# Patient Record
Sex: FEMALE | Race: WHITE | Employment: FULL TIME | URBAN - METROPOLITAN AREA
[De-identification: names, ages, dates, MRNs, and addresses within clinical notes are randomized per-mention and may not be internally consistent; named-entity substitution may affect disease eponyms.]

---

## 2017-03-10 ENCOUNTER — TELEPHONE (OUTPATIENT)
Dept: FAMILY MEDICINE CLINIC | Age: 32
End: 2017-03-10

## 2017-03-10 NOTE — TELEPHONE ENCOUNTER
Contacted pt to make an appt. Pt said her pap is not due yet,explnd,not a physical appt,pt said she does not wish to pay $15 to just get a prescription change,she will wait and continue as is.

## 2017-03-10 NOTE — TELEPHONE ENCOUNTER
[address] office staff instructed to have patient schedule a f/u for refills per DR. Penn.

## 2017-04-20 ENCOUNTER — TELEPHONE (OUTPATIENT)
Dept: FAMILY MEDICINE CLINIC | Age: 32
End: 2017-04-20

## 2017-04-24 ENCOUNTER — OFFICE VISIT (OUTPATIENT)
Dept: FAMILY MEDICINE CLINIC | Age: 32
End: 2017-04-24

## 2017-04-24 VITALS
TEMPERATURE: 98.5 F | HEART RATE: 67 BPM | HEIGHT: 64 IN | OXYGEN SATURATION: 99 % | WEIGHT: 137 LBS | DIASTOLIC BLOOD PRESSURE: 86 MMHG | BODY MASS INDEX: 23.39 KG/M2 | SYSTOLIC BLOOD PRESSURE: 124 MMHG

## 2017-04-24 DIAGNOSIS — Z30.09 FAMILY PLANNING: Primary | ICD-10-CM

## 2017-04-24 RX ORDER — DROSPIRENONE AND ETHINYL ESTRADIOL 0.02-3(28)
1 KIT ORAL DAILY
Qty: 1 PACKAGE | Refills: 1 | Status: SHIPPED | OUTPATIENT
Start: 2017-04-24 | End: 2017-05-25 | Stop reason: SDUPTHER

## 2017-04-24 NOTE — MR AVS SNAPSHOT
Visit Information Date & Time Provider Department Dept. Phone Encounter #  
 4/24/2017 11:00 AM Jorgito Butts NP [address] [id number] Follow-up Instructions Return if symptoms worsen or fail to improve, for has a scheduled appointment with Dr. Ulises Sorensen for the 17th of May. Your Appointments 5/17/2017 10:00 AM  
PAP/PELVIC with Cecilio Daigle MD  
[address] (--) Appt Note: pap/pelvic  
 [address] [id number] [phone]  
  
   
 [address] [place] [id number] Upcoming Health Maintenance Date Due DTaP/Tdap/Td series (1 - Tdap) 2/17/2008 PAP AKA CERVICAL CYTOLOGY 2/20/2016 INFLUENZA AGE 9 TO ADULT 8/1/2016 Allergies as of 4/24/2017  Review Complete On: 4/24/2017 By: Jorgito Butts NP No Known Allergies Current Immunizations  Never Reviewed No immunizations on file. Not reviewed this visit You Were Diagnosed With   
  
 Codes Comments Family planning    -  Primary ICD-10-CM: Z30.09 
ICD-9-CM: V25.09 Vitals BP Pulse Temp Height(growth percentile) Weight(growth percentile) LMP  
 124/86 (BP 1 Location: Right arm, BP Patient Position: Sitting) 67 98.5 °F (36.9 °C) (Oral) 5' 4\" (1.626 m) 137 lb (62.1 kg) 04/08/2017 SpO2 BMI OB Status Smoking Status 99% 23.52 kg/m2 Having regular periods Never Smoker BMI and BSA Data Body Mass Index Body Surface Area  
 23.52 kg/m 2 1.67 m 2 Preferred Pharmacy Pharmacy [facility] [address] [phone] Your Updated Medication List  
  
   
This list is accurate as of: 4/24/17 11:37 AM.  Always use your most recent med list.  
  
  
  
  
 cyclobenzaprine 10 mg tablet Commonly known as:  FLEXERIL Take 1 Tab by mouth three (3) times daily as needed for Muscle Spasm(s). drospirenone-ethinyl estradiol 3-0.02 mg Tab Commonly known as:  Veronur Sullivan (28) Take 1 Tab by mouth daily. HYDROcodone-acetaminophen 5-325 mg per tablet Commonly known as:  Jarome Smita Take 1 Tab by mouth every four (4) hours as needed. ibuprofen 800 mg tablet Commonly known as:  MOTRIN Take 1 Tab by mouth every eight (8) hours as needed for Pain. Prescriptions Sent to Pharmacy Refills  
 drospirenone-ethinyl estradiol (GIANVI, 28,) 3-0.02 mg tab 1 Sig: Take 1 Tab by mouth daily. Class: Normal  
 Pharmacy: [facility] [address] [facility] #: [phone] Route: Oral  
  
Follow-up Instructions Return if symptoms worsen or fail to improve, for has a scheduled appointment with Dr. Ximena Gray for the 17th of May. Patient Instructions Drospirenone/Ethinyl Estradiol (By mouth) Drospirenone (bnvf-VSGN-dd-none), Ethinyl Estradiol (ETH-i-nil es-tra-DYE-ol) Prevents pregnancy. Also treats premenstrual dysphoric disorder (PMDD) and acne. Brand Name(s):Drospirenone-Ethinyl Estradiol, Aaron Nunez Syeda, [address] [facility], [address] [place] [address], [facility], [address], [facility] There may be other brand names for this medicine. When This Medicine Should Not Be Used: This medicine is not right for everyone. Do not use it if you had an allergic reaction to drospirenone or ethinyl estradiol, or if you are pregnant. Do not use it if you have unusual vaginal bleeding that has not been checked by your doctor, kidney disease, liver disease, adrenal problems, certain heart problems, a blood clotting disorder, or diabetes with kidney, eye, nerve, or blood vessel damage. Do not use it if you have a history of breast cancer. How to Use This Medicine:  
Tablet · Your doctor will tell you how much medicine to use. Do not use more than directed. · Each brand of birth control pills has specific directions.  Follow the patient instructions for your prescribed brand. Talk to your doctor or pharmacist if you have any questions. · Take this medicine at the same time each day, preferably after your evening meal or at bedtime. Birth control pills work best when there is no more than 24 hours between doses. Keep the pills in the original container. Take the pills in the order they appear in the container. · Swallow the tablet whole. Do not crush, break, or chew it. · Follow the instructions in the patient leaflet or call your doctor if you vomit or have diarrhea within 3 to 4 hours of taking this medicine. · Missed dose: Carefully follow the patient instructions if you miss a dose. You may need to use a second form of birth control for several days. Ask your doctor or pharmacist if you have any questions. · Store the medicine in a closed container at room temperature, away from heat, moisture, and direct light. Drugs and Foods to Avoid: Ask your doctor or pharmacist before using any other medicine, including over-the-counter medicines, vitamins, and herbal products. · There are many foods and medicines can affect how drospirenone/ethinyl estradiol works. Tell your doctor about all medicines that you are using. · Do not eat grapefruit or drink grapefruit juice while you are using this medicine. Warnings While Using This Medicine: · Tell your doctor right away if you become pregnant. This medicine could harm your unborn baby if you take it while you are pregnant. · Tell your doctor if you are breastfeeding, or if you had a baby within 4 weeks before you start using this medicine. Tell your doctor if you have inherited angioedema, diabetes, heart or blood vessel disease, high blood pressure, high cholesterol, migraines, or a history of depression or chloasma, especially during pregnancy. Tell your doctor if you have ever had cholestasis (jaundice) caused by pregnancy or birth control pills. Tell your doctor if you smoke. · This medicine may cause the following problems: 
¨ Increased risk of blood clots, which may cause a stroke or heart attack ¨ Possible increased risk of breast or cervical cancer ¨ Liver problems ¨ High blood pressure ¨ Gallbladder disease · You may need to stop using this medicine for a few weeks before and after you have surgery because of the risk of blood clots. · This medicine will not protect you from HIV/AIDS or other sexually transmitted diseases. Talk with your doctor if you have questions. · Tell any doctor or dentist who treats you that you are using this medicine. This medicine may affect certain medical test results. · Your doctor will check the effects of this medicine at regular visits. He may also monitor your blood pressure. Keep all appointments. · Keep all medicine out of the reach of children. Never share your medicine with anyone. Possible Side Effects While Using This Medicine:  
Call your doctor right away if you notice any of these side effects: · Allergic reaction: Itching or hives, swelling in your face or hands, swelling or tingling in your mouth or throat, chest tightness, trouble breathing · Chest pain, trouble breathing, coughing up blood · Confusion, weakness, uneven heartbeat, numbness in your hands, feet, or lips · Dark urine or pale stools, nausea, vomiting, loss of appetite, stomach pain, yellow skin or eyes · Irregular, late, or missed menstrual period · Numbness or weakness on one side of your body, pain in your lower leg, sudden or severe headache, problems with vision, speech, or walking · Severe stomach pain, nausea, vomiting · Unusual or unexpected vaginal bleeding or heavy bleeding If you notice these less serious side effects, talk with your doctor: · Breast pain or tenderness · Headache · Mild nausea, bloating · Vaginal spotting, light bleeding, itching, discharge If you notice other side effects that you think are caused by this medicine, tell your doctor. Call your doctor for medical advice about side effects. You may report side effects to FDA at 9-377-DQL-3704 © 2016 [address] is for End User's use only and may not be sold, redistributed or otherwise used for commercial purposes. The above information is an  only. It is not intended as medical advice for individual conditions or treatments. Talk to your doctor, nurse or pharmacist before following any medical regimen to see if it is safe and effective for you. Drospirenone/Ethinyl Estradiol (By mouth) Drospirenone (dnhm-XEKA-ri-none), Ethinyl Estradiol (ETH-i-nil es-tra-DYE-ol) Prevents pregnancy. Also treats premenstrual dysphoric disorder (PMDD) and acne. Brand Name(s):Drospirenone-Ethinyl Estradiol, Norkatin Paul, Aaron [place], [place], [address] [facility], [address] [place] [address], [facility], [address], Venetta Factor There may be other brand names for this medicine. When This Medicine Should Not Be Used: This medicine is not right for everyone. Do not use it if you had an allergic reaction to drospirenone or ethinyl estradiol, or if you are pregnant. Do not use it if you have unusual vaginal bleeding that has not been checked by your doctor, kidney disease, liver disease, adrenal problems, certain heart problems, a blood clotting disorder, or diabetes with kidney, eye, nerve, or blood vessel damage. Do not use it if you have a history of breast cancer. How to Use This Medicine:  
Tablet · Your doctor will tell you how much medicine to use. Do not use more than directed. · Each brand of birth control pills has specific directions. Follow the patient instructions for your prescribed brand. Talk to your doctor or pharmacist if you have any questions. · Take this medicine at the same time each day, preferably after your evening meal or at bedtime. Birth control pills work best when there is no more than 24 hours between doses.  Keep the pills in the original container. Take the pills in the order they appear in the container. · Swallow the tablet whole. Do not crush, break, or chew it. · Follow the instructions in the patient leaflet or call your doctor if you vomit or have diarrhea within 3 to 4 hours of taking this medicine. · Missed dose: Carefully follow the patient instructions if you miss a dose. You may need to use a second form of birth control for several days. Ask your doctor or pharmacist if you have any questions. · Store the medicine in a closed container at room temperature, away from heat, moisture, and direct light. Drugs and Foods to Avoid: Ask your doctor or pharmacist before using any other medicine, including over-the-counter medicines, vitamins, and herbal products. · There are many foods and medicines can affect how drospirenone/ethinyl estradiol works. Tell your doctor about all medicines that you are using. · Do not eat grapefruit or drink grapefruit juice while you are using this medicine. Warnings While Using This Medicine: · Tell your doctor right away if you become pregnant. This medicine could harm your unborn baby if you take it while you are pregnant. · Tell your doctor if you are breastfeeding, or if you had a baby within 4 weeks before you start using this medicine. Tell your doctor if you have inherited angioedema, diabetes, heart or blood vessel disease, high blood pressure, high cholesterol, migraines, or a history of depression or chloasma, especially during pregnancy. Tell your doctor if you have ever had cholestasis (jaundice) caused by pregnancy or birth control pills. Tell your doctor if you smoke. · This medicine may cause the following problems: 
¨ Increased risk of blood clots, which may cause a stroke or heart attack ¨ Possible increased risk of breast or cervical cancer ¨ Liver problems ¨ High blood pressure ¨ Gallbladder disease · You may need to stop using this medicine for a few weeks before and after you have surgery because of the risk of blood clots. · This medicine will not protect you from HIV/AIDS or other sexually transmitted diseases. Talk with your doctor if you have questions. · Tell any doctor or dentist who treats you that you are using this medicine. This medicine may affect certain medical test results. · Your doctor will check the effects of this medicine at regular visits. He may also monitor your blood pressure. Keep all appointments. · Keep all medicine out of the reach of children. Never share your medicine with anyone. Possible Side Effects While Using This Medicine:  
Call your doctor right away if you notice any of these side effects: · Allergic reaction: Itching or hives, swelling in your face or hands, swelling or tingling in your mouth or throat, chest tightness, trouble breathing · Chest pain, trouble breathing, coughing up blood · Confusion, weakness, uneven heartbeat, numbness in your hands, feet, or lips · Dark urine or pale stools, nausea, vomiting, loss of appetite, stomach pain, yellow skin or eyes · Irregular, late, or missed menstrual period · Numbness or weakness on one side of your body, pain in your lower leg, sudden or severe headache, problems with vision, speech, or walking · Severe stomach pain, nausea, vomiting · Unusual or unexpected vaginal bleeding or heavy bleeding If you notice these less serious side effects, talk with your doctor: · Breast pain or tenderness · Headache · Mild nausea, bloating · Vaginal spotting, light bleeding, itching, discharge If you notice other side effects that you think are caused by this medicine, tell your doctor. Call your doctor for medical advice about side effects. You may report side effects to FDA at 3-436-FDA-5613 © 2016 [address] is for End User's use only and may not be sold, redistributed or otherwise used for commercial purposes. The above information is an  only. It is not intended as medical advice for individual conditions or treatments. Talk to your doctor, nurse or pharmacist before following any medical regimen to see if it is safe and effective for you. Introducing [facility]! Lindaab Cortes introduces [employer] patient portal. Now you can access parts of your medical record, email your doctor's office, and request medication refills online. 1. In your internet browser, go to [email or web address] 2. Click on the First Time User? Click Here link in the Sign In box. You will see the New Member Sign Up page. 3. Enter your [employer] Access Code exactly as it appears below. You will not need to use this code after youve completed the sign-up process. If you do not sign up before the expiration date, you must request a new code. · [employer] Access Code: Isabel Robles Expires: 7/23/2017 11:37 AM 
 
4. Enter the last four digits of your Social Security Number (xxxx) and Date of Birth (mm/dd/yyyy) as indicated and click Submit. You will be taken to the next sign-up page. 5. Create a [employer] ID. This will be your [employer] login ID and cannot be changed, so think of one that is secure and easy to remember. 6. Create a [employer] password. You can change your password at any time. 7. Enter your Password Reset Question and Answer. This can be used at a later time if you forget your password. 8. Enter your e-mail address. You will receive e-mail notification when new information is available in [address] 9. Click Sign Up. You can now view and download portions of your medical record. 10. Click the Download Summary menu link to download a portable copy of your medical information. If you have questions, please visit the Frequently Asked Questions section of the [employer] website. Remember, [employer] is NOT to be used for urgent needs. For medical emergencies, dial 911. Now available from your iPhone and Android! Please provide this summary of care documentation to your next provider. Your primary care clinician is listed as Francisco Javier Middleton. If you have any questions after today's visit, please call [phone].

## 2017-04-24 NOTE — PATIENT INSTRUCTIONS
Drospirenone/Ethinyl Estradiol (By mouth)   Drospirenone (vroc-XHWB-cf-none), Ethinyl Estradiol (ETH-i-nil es-tra-DYE-ol)  Prevents pregnancy. Also treats premenstrual dysphoric disorder (PMDD) and acne. Brand Name(s):Drospirenone-Ethinyl Estradiol, Tod Higgins, Marga, Ocella, Roslyn, Vestura, MARCELINO, Svitlana, Marcelino 28, Mindy   There may be other brand names for this medicine. When This Medicine Should Not Be Used: This medicine is not right for everyone. Do not use it if you had an allergic reaction to drospirenone or ethinyl estradiol, or if you are pregnant. Do not use it if you have unusual vaginal bleeding that has not been checked by your doctor, kidney disease, liver disease, adrenal problems, certain heart problems, a blood clotting disorder, or diabetes with kidney, eye, nerve, or blood vessel damage. Do not use it if you have a history of breast cancer. How to Use This Medicine:   Tablet  · Your doctor will tell you how much medicine to use. Do not use more than directed. · Each brand of birth control pills has specific directions. Follow the patient instructions for your prescribed brand. Talk to your doctor or pharmacist if you have any questions. · Take this medicine at the same time each day, preferably after your evening meal or at bedtime. Birth control pills work best when there is no more than 24 hours between doses. Keep the pills in the original container. Take the pills in the order they appear in the container. · Swallow the tablet whole. Do not crush, break, or chew it. · Follow the instructions in the patient leaflet or call your doctor if you vomit or have diarrhea within 3 to 4 hours of taking this medicine. · Missed dose: Carefully follow the patient instructions if you miss a dose. You may need to use a second form of birth control for several days. Ask your doctor or pharmacist if you have any questions.   · Store the medicine in a closed container at room temperature, away from heat, moisture, and direct light. Drugs and Foods to Avoid:   Ask your doctor or pharmacist before using any other medicine, including over-the-counter medicines, vitamins, and herbal products. · There are many foods and medicines can affect how drospirenone/ethinyl estradiol works. Tell your doctor about all medicines that you are using. · Do not eat grapefruit or drink grapefruit juice while you are using this medicine. Warnings While Using This Medicine:   · Tell your doctor right away if you become pregnant. This medicine could harm your unborn baby if you take it while you are pregnant. · Tell your doctor if you are breastfeeding, or if you had a baby within 4 weeks before you start using this medicine. Tell your doctor if you have inherited angioedema, diabetes, heart or blood vessel disease, high blood pressure, high cholesterol, migraines, or a history of depression or chloasma, especially during pregnancy. Tell your doctor if you have ever had cholestasis (jaundice) caused by pregnancy or birth control pills. Tell your doctor if you smoke. · This medicine may cause the following problems:  ¨ Increased risk of blood clots, which may cause a stroke or heart attack  ¨ Possible increased risk of breast or cervical cancer  ¨ Liver problems  ¨ High blood pressure  ¨ Gallbladder disease  · You may need to stop using this medicine for a few weeks before and after you have surgery because of the risk of blood clots. · This medicine will not protect you from HIV/AIDS or other sexually transmitted diseases. Talk with your doctor if you have questions. · Tell any doctor or dentist who treats you that you are using this medicine. This medicine may affect certain medical test results. · Your doctor will check the effects of this medicine at regular visits. He may also monitor your blood pressure. Keep all appointments. · Keep all medicine out of the reach of children.  Never share your medicine with anyone. Possible Side Effects While Using This Medicine:   Call your doctor right away if you notice any of these side effects:  · Allergic reaction: Itching or hives, swelling in your face or hands, swelling or tingling in your mouth or throat, chest tightness, trouble breathing  · Chest pain, trouble breathing, coughing up blood  · Confusion, weakness, uneven heartbeat, numbness in your hands, feet, or lips  · Dark urine or pale stools, nausea, vomiting, loss of appetite, stomach pain, yellow skin or eyes  · Irregular, late, or missed menstrual period  · Numbness or weakness on one side of your body, pain in your lower leg, sudden or severe headache, problems with vision, speech, or walking  · Severe stomach pain, nausea, vomiting  · Unusual or unexpected vaginal bleeding or heavy bleeding  If you notice these less serious side effects, talk with your doctor:   · Breast pain or tenderness  · Headache  · Mild nausea, bloating  · Vaginal spotting, light bleeding, itching, discharge  If you notice other side effects that you think are caused by this medicine, tell your doctor. Call your doctor for medical advice about side effects. You may report side effects to FDA at 7-680-FDA-7940  © 2016 [address] is for End User's use only and may not be sold, redistributed or otherwise used for commercial purposes. The above information is an  only. It is not intended as medical advice for individual conditions or treatments. Talk to your doctor, nurse or pharmacist before following any medical regimen to see if it is safe and effective for you. Drospirenone/Ethinyl Estradiol (By mouth)   Drospirenone ([id number]-none), Ethinyl Estradiol (ETH-i-nil es-tra-DYE-ol)  Prevents pregnancy. Also treats premenstrual dysphoric disorder (PMDD) and acne.    Brand Name(s):Drospirenone-Ethinyl Estradiol, Darletta Pastures, Marga, Ocella, Roslyn, Vestura, MARCELINO, Svitlana, Marcelino 28, Mindy   There may be other brand names for this medicine. When This Medicine Should Not Be Used: This medicine is not right for everyone. Do not use it if you had an allergic reaction to drospirenone or ethinyl estradiol, or if you are pregnant. Do not use it if you have unusual vaginal bleeding that has not been checked by your doctor, kidney disease, liver disease, adrenal problems, certain heart problems, a blood clotting disorder, or diabetes with kidney, eye, nerve, or blood vessel damage. Do not use it if you have a history of breast cancer. How to Use This Medicine:   Tablet  · Your doctor will tell you how much medicine to use. Do not use more than directed. · Each brand of birth control pills has specific directions. Follow the patient instructions for your prescribed brand. Talk to your doctor or pharmacist if you have any questions. · Take this medicine at the same time each day, preferably after your evening meal or at bedtime. Birth control pills work best when there is no more than 24 hours between doses. Keep the pills in the original container. Take the pills in the order they appear in the container. · Swallow the tablet whole. Do not crush, break, or chew it. · Follow the instructions in the patient leaflet or call your doctor if you vomit or have diarrhea within 3 to 4 hours of taking this medicine. · Missed dose: Carefully follow the patient instructions if you miss a dose. You may need to use a second form of birth control for several days. Ask your doctor or pharmacist if you have any questions. · Store the medicine in a closed container at room temperature, away from heat, moisture, and direct light. Drugs and Foods to Avoid:   Ask your doctor or pharmacist before using any other medicine, including over-the-counter medicines, vitamins, and herbal products. · There are many foods and medicines can affect how drospirenone/ethinyl estradiol works.  Tell your doctor about all medicines that you are using.  · Do not eat grapefruit or drink grapefruit juice while you are using this medicine. Warnings While Using This Medicine:   · Tell your doctor right away if you become pregnant. This medicine could harm your unborn baby if you take it while you are pregnant. · Tell your doctor if you are breastfeeding, or if you had a baby within 4 weeks before you start using this medicine. Tell your doctor if you have inherited angioedema, diabetes, heart or blood vessel disease, high blood pressure, high cholesterol, migraines, or a history of depression or chloasma, especially during pregnancy. Tell your doctor if you have ever had cholestasis (jaundice) caused by pregnancy or birth control pills. Tell your doctor if you smoke. · This medicine may cause the following problems:  ¨ Increased risk of blood clots, which may cause a stroke or heart attack  ¨ Possible increased risk of breast or cervical cancer  ¨ Liver problems  ¨ High blood pressure  ¨ Gallbladder disease  · You may need to stop using this medicine for a few weeks before and after you have surgery because of the risk of blood clots. · This medicine will not protect you from HIV/AIDS or other sexually transmitted diseases. Talk with your doctor if you have questions. · Tell any doctor or dentist who treats you that you are using this medicine. This medicine may affect certain medical test results. · Your doctor will check the effects of this medicine at regular visits. He may also monitor your blood pressure. Keep all appointments. · Keep all medicine out of the reach of children. Never share your medicine with anyone.   Possible Side Effects While Using This Medicine:   Call your doctor right away if you notice any of these side effects:  · Allergic reaction: Itching or hives, swelling in your face or hands, swelling or tingling in your mouth or throat, chest tightness, trouble breathing  · Chest pain, trouble breathing, coughing up blood  · Confusion, weakness, uneven heartbeat, numbness in your hands, feet, or lips  · Dark urine or pale stools, nausea, vomiting, loss of appetite, stomach pain, yellow skin or eyes  · Irregular, late, or missed menstrual period  · Numbness or weakness on one side of your body, pain in your lower leg, sudden or severe headache, problems with vision, speech, or walking  · Severe stomach pain, nausea, vomiting  · Unusual or unexpected vaginal bleeding or heavy bleeding  If you notice these less serious side effects, talk with your doctor:   · Breast pain or tenderness  · Headache  · Mild nausea, bloating  · Vaginal spotting, light bleeding, itching, discharge  If you notice other side effects that you think are caused by this medicine, tell your doctor. Call your doctor for medical advice about side effects. You may report side effects to FDA at 1-862-FDA-2738  © 2016 [address] is for End User's use only and may not be sold, redistributed or otherwise used for commercial purposes. The above information is an  only. It is not intended as medical advice for individual conditions or treatments. Talk to your doctor, nurse or pharmacist before following any medical regimen to see if it is safe and effective for you.

## 2017-04-24 NOTE — PROGRESS NOTES
1. Have you been to the ER, urgent care clinic since your last visit? Hospitalized since your last visit? No    2. Have you seen or consulted any other health care providers outside of the [address] [place] [address] since your last visit? Include any pap smears or colon screening.  No    Is someone accompanying this pt? no    Is the patient using any DME equipment during OV? no      Chief Complaint   Patient presents with    Medication Refill

## 2017-04-24 NOTE — PROGRESS NOTES
HPI  Jojo Knight is a 29 y.o. female  Chief Complaint   Patient presents with    Medication Refill     Here for medication refill of birth control pill. Reports she has another appointment with Dr. Caren Witt for a full wll women on the 17th but needs a refill until then. Reports she switches between the pills and patches during the summer because she swims and sweats more which causes the patches to come off. Requesting a refill on her pill till she is able to get in to see Dr. Caren Witt. Denies chest pain, shortness of breath, leg pains, leg swelling or chest palpitations. Reports last menses on April 8th. Denies any other sexual health issues or side effects from prior pill or patch use. Past Medical History  Past Medical History:   Diagnosis Date    Concussion 10/4/2010    Migraine 10/4/2010       Surgical History  History reviewed. No pertinent surgical history. Medications  Current Outpatient Prescriptions   Medication Sig Dispense Refill    drospirenone-ethinyl estradiol (GIANVI, 28,) 3-0.02 mg tab Take 1 Tab by mouth daily. 1 Package 1    HYDROcodone-acetaminophen (NORCO) 5-325 mg per tablet Take 1 Tab by mouth every four (4) hours as needed. 0    cyclobenzaprine (FLEXERIL) 10 mg tablet Take 1 Tab by mouth three (3) times daily as needed for Muscle Spasm(s). 30 Tab 0    ibuprofen (MOTRIN) 800 mg tablet Take 1 Tab by mouth every eight (8) hours as needed for Pain. 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       Social History Main Topics    Smoking status: Never Smoker    Smokeless tobacco: Never Used    Alcohol use Yes      Comment: due to recent pregnancy patient has stopped all alcohol drinking/ now on occassion    Drug use: Yes     Special: Prescription, OTC    Sexual activity: Not on file     Other Topics Concern    Not on file     Social History Narrative       Problem List  Patient Active Problem List   Diagnosis Code    Concussion S06. 0X9A    Migraine G43.909       Review of Systems  Review of Systems   Respiratory: Negative for shortness of breath. Cardiovascular: Negative for chest pain and palpitations. Gastrointestinal: Negative for abdominal pain, nausea and vomiting. Genitourinary: Negative for dysuria, frequency, hematuria and urgency. Vital Signs  Vitals:    04/24/17 1114   BP: 124/86   Pulse: 67   Temp: 98.5 °F (36.9 °C)   TempSrc: Oral   SpO2: 99%   Weight: 137 lb (62.1 kg)   Height: 5' 4\" (1.626 m)   PainSc:   0 - No pain   LMP: 04/08/2017       Physical Exam  Physical Exam   Constitutional: She is oriented to person, place, and time. HENT:   Mouth/Throat: Oropharynx is clear and moist.   Cardiovascular: Normal rate, regular rhythm, normal heart sounds and intact distal pulses. Exam reveals no gallop and no friction rub. No murmur heard. Pulmonary/Chest: Effort normal and breath sounds normal. No respiratory distress. She has no wheezes. Abdominal: Soft. Bowel sounds are normal. She exhibits no distension. There is no tenderness. Neurological: She is alert and oriented to person, place, and time. Coordination normal.   Skin: Skin is warm and dry. Psychiatric: She has a normal mood and affect. Her behavior is normal. Judgment and thought content normal.   Vitals reviewed. Diagnostics  Orders Placed This Encounter    drospirenone-ethinyl estradiol (GIANVI, 28,) 3-0.02 mg tab     Sig: Take 1 Tab by mouth daily.      Dispense:  1 Package     Refill:  1       Results  Results for orders placed or performed in visit on 09/16/16   [facility] VAGINITIS PLUS   Result Value Ref Range    Atopobium vaginae High - 2 (A) Score    BVAB 2 Low - 0 Score    Megasphaera 1 High - 2 (A) Score    C. albicans, LINDSEY Negative Negative    C. glabrata, LINDSEY Negative Negative    T. vaginalis, LINDSEY Negative Negative    C. trachomatis, LINDSEY Negative Negative    N. gonorrhoeae, LINDSEY Negative Negative         Assessment and Plan  Keon Patton was seen today for medication refill. Diagnoses and all orders for this visit:    Family planning    Other orders  -     drospirenone-ethinyl estradiol (GIANVI, 28,) 3-0.02 mg tab; Take 1 Tab by mouth daily. After care summary printed and reviewed with patient. Plan reviewed with patient. Questions answered. Patient verbalized understanding of plan and is in agreement with plan. Patient to follow up in one Month or earlier if symptoms worsen.  Next appointment already scheduled with her PCP Dr. Oumou Spears for a full well women exam.     Colonel Rizzo, MELISSAP-C

## 2017-05-25 RX ORDER — DROSPIRENONE AND ETHINYL ESTRADIOL 0.02-3(28)
1 KIT ORAL DAILY
Qty: 1 PACKAGE | Refills: 1 | Status: SHIPPED | OUTPATIENT
Start: 2017-05-25 | End: 2017-08-17 | Stop reason: SDUPTHER

## 2017-06-01 ENCOUNTER — OFFICE VISIT (OUTPATIENT)
Dept: FAMILY MEDICINE CLINIC | Age: 32
End: 2017-06-01

## 2017-06-01 VITALS
HEART RATE: 82 BPM | HEIGHT: 64 IN | WEIGHT: 139 LBS | OXYGEN SATURATION: 100 % | DIASTOLIC BLOOD PRESSURE: 77 MMHG | BODY MASS INDEX: 23.73 KG/M2 | RESPIRATION RATE: 20 BRPM | TEMPERATURE: 98.7 F | SYSTOLIC BLOOD PRESSURE: 125 MMHG

## 2017-06-01 DIAGNOSIS — Z01.419 ENCOUNTER FOR WELL WOMAN EXAM WITH ROUTINE GYNECOLOGICAL EXAM: Primary | ICD-10-CM

## 2017-06-01 NOTE — PROGRESS NOTES
Dajuan Lazo [id number] [address]ble female   Chief Complaint   Patient presents with    Well Woman         1. Have you been to the ER, urgent care clinic since your last visit? Hospitalized since your last visit? No    2. Have you seen or consulted any other health care providers outside of the [address] [place] [address] since your last visit? Include any pap smears or colon screening.  No

## 2017-06-01 NOTE — PROGRESS NOTES
Chief Complaint   Patient presents with    Well Woman     SUBJECTIVE:   29 y.o. female for annual routine Pap and checkup. Current Outpatient Prescriptions   Medication Sig Dispense Refill    drospirenone-ethinyl estradiol (GIANVI, 28,) 3-0.02 mg tab Take 1 Tab by mouth daily. 1 Package 1    HYDROcodone-acetaminophen (NORCO) 5-325 mg per tablet Take 1 Tab by mouth every four (4) hours as needed. 0    cyclobenzaprine (FLEXERIL) 10 mg tablet Take 1 Tab by mouth three (3) times daily as needed for Muscle Spasm(s). 30 Tab 0    ibuprofen (MOTRIN) 800 mg tablet Take 1 Tab by mouth every eight (8) hours as needed for Pain. 60 Tab 0     Allergies: Review of patient's allergies indicates no known allergies. Patient's last menstrual period was 05/24/2017. ROS:  Feeling well. No dyspnea or chest pain on exertion. No abdominal pain, change in bowel habits, black or bloody stools. No urinary tract symptoms. GYN ROS: normal menses, no abnormal bleeding, pelvic pain or discharge, no breast pain or new or enlarging lumps on self exam. No neurological complaints. OBJECTIVE:   Visit Vitals    /77    Pulse 82    Temp 98.7 °F (37.1 °C) (Oral)    Resp 20    Ht 5' 4\" (1.626 m)    Wt 139 lb (63 kg)    LMP 05/24/2017    SpO2 100%    BMI 23.86 kg/m2       GEN:The patient appears well, in no distress. EYES: conjunctiva/lids no edema, no erythema, no injection, no icterus  ENT normal ext ears. Normal ext nose. No thyromegaly.    Lungs: nl respiratory effort  Extremities show no edema  Neurological: alert, oriented x 3    BREAST EXAM: breasts appear normal, no suspicious masses, no skin or nipple changes or axillary nodes    PELVIC EXAM: normal external genitalia, vulva, vagina, cervix, uterus and adnexa    ASSESSMENT:   well woman    PLAN:   pap smear  return annually or prn

## 2017-06-06 LAB
CYTOLOGIST CVX/VAG CYTO: NORMAL
CYTOLOGY CVX/VAG DOC THIN PREP: NORMAL
DX ICD CODE: NORMAL
HPV I/H RISK 1 DNA CVX QL PROBE+SIG AMP: NEGATIVE
Lab: NORMAL
Lab: NORMAL
OTHER STN SPEC: NORMAL
PATH REPORT.FINAL DX SPEC: NORMAL
STAT OF ADQ CVX/VAG CYTO-IMP: NORMAL

## 2017-06-08 NOTE — PROGRESS NOTES
Please notify pt: nl cytology as well as negative HPV testing. [id number] Romy Bernal to retest in 5 yrs unless she has concerns sooner.

## 2017-08-14 RX ORDER — DROSPIRENONE AND ETHINYL ESTRADIOL 0.02-3(28)
KIT ORAL
Qty: 28 TAB | Refills: 0 | OUTPATIENT
Start: 2017-08-14

## 2017-08-17 RX ORDER — DROSPIRENONE AND ETHINYL ESTRADIOL 0.02-3(28)
1 KIT ORAL DAILY
Qty: 1 PACKAGE | Refills: 1 | Status: SHIPPED | OUTPATIENT
Start: 2017-08-17 | End: 2017-12-07 | Stop reason: SDUPTHER

## 2017-11-30 ENCOUNTER — OFFICE VISIT (OUTPATIENT)
Dept: FAMILY MEDICINE CLINIC | Age: 32
End: 2017-11-30

## 2017-11-30 VITALS
TEMPERATURE: 98.4 F | BODY MASS INDEX: 24.07 KG/M2 | DIASTOLIC BLOOD PRESSURE: 81 MMHG | SYSTOLIC BLOOD PRESSURE: 134 MMHG | WEIGHT: 141 LBS | HEIGHT: 64 IN | OXYGEN SATURATION: 99 % | HEART RATE: 86 BPM

## 2017-11-30 DIAGNOSIS — R09.81 NASAL CONGESTION: ICD-10-CM

## 2017-11-30 DIAGNOSIS — Z91.09 ENVIRONMENTAL ALLERGIES: Primary | ICD-10-CM

## 2017-11-30 RX ORDER — AMOXICILLIN AND CLAVULANATE POTASSIUM 875; 125 MG/1; MG/1
1 TABLET, FILM COATED ORAL EVERY 12 HOURS
Qty: 14 TAB | Refills: 0 | Status: SHIPPED | OUTPATIENT
Start: 2017-11-30 | End: 2017-11-30 | Stop reason: CLARIF

## 2017-11-30 NOTE — PROGRESS NOTES
SUNSHINE Leos is a 29 y.o. female  Chief Complaint   Patient presents with    Nasal Congestion     started yesterday, was sick 10/23/17, went to [facility], told had a virus    Sneezing     started yesterday, constant sneezing, has not tried any OTC medications,denies fever, sore throat, chills, body aches     Reports congestion and sneezing for the last two days. Denies fevers or chills. Denies sore throat. Denies body aches. Denies having allergies. Denies taking anything for symptoms. Past Medical History  Past Medical History:   Diagnosis Date    Concussion 10/4/2010    Migraine 10/4/2010       Surgical History  No past surgical history on file. Medications  Current Outpatient Prescriptions   Medication Sig Dispense Refill    drospirenone-ethinyl estradiol (GIANVI, 28,) 3-0.02 mg tab Take 1 Tab by mouth daily. 1 Package 1    HYDROcodone-acetaminophen (NORCO) 5-325 mg per tablet Take 1 Tab by mouth every four (4) hours as needed. 0    cyclobenzaprine (FLEXERIL) 10 mg tablet Take 1 Tab by mouth three (3) times daily as needed for Muscle Spasm(s). 30 Tab 0    ibuprofen (MOTRIN) 800 mg tablet Take 1 Tab by mouth every eight (8) hours as needed for Pain. 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       Social History Main Topics    Smoking status: Never Smoker    Smokeless tobacco: Never Used    Alcohol use Yes      Comment: due to recent pregnancy patient has stopped all alcohol drinking/ now on occassion    Drug use: Yes     Special: Prescription, OTC    Sexual activity: Not on file     Other Topics Concern    Not on file     Social History Narrative       Problem List  Patient Active Problem List   Diagnosis Code    Concussion S06. 0X9A    Migraine G43.909       Review of Systems  Review of Systems   Constitutional: Negative for chills and fever. HENT: Positive for congestion. Negative for ear pain and sore throat. Respiratory: Negative for cough, sputum production, shortness of breath and wheezing. Cardiovascular: Negative for chest pain. Endo/Heme/Allergies: Negative for environmental allergies. Vital Signs  Vitals:    11/30/17 1614   BP: 134/81   Pulse: 86   Temp: 98.4 °F (36.9 °C)   TempSrc: Oral   SpO2: 99%   Weight: 141 lb (64 kg)   Height: 5' 4\" (1.626 m)   PainSc:   0 - No pain       Physical Exam  Physical Exam   Constitutional: She is oriented to person, place, and time. HENT:   Right Ear: External ear normal.   Left Ear: External ear normal.   Nose: No mucosal edema or rhinorrhea. Right sinus exhibits maxillary sinus tenderness (mild pressure). Mouth/Throat: Oropharynx is clear and moist.   Cardiovascular: Normal rate, regular rhythm and normal heart sounds. No murmur heard. Pulmonary/Chest: Effort normal and breath sounds normal. No respiratory distress. She has no wheezes. Lymphadenopathy:     She has no cervical adenopathy. Neurological: She is alert and oriented to person, place, and time. Psychiatric: She has a normal mood and affect. Vitals reviewed. Diagnostics  Orders Placed This Encounter    DISCONTD: amoxicillin-clavulanate (AUGMENTIN) 875-125 mg per tablet     Sig: Take 1 Tab by mouth every twelve (12) hours for 7 days.      Dispense:  14 Tab     Refill:  0       Results  Results for orders placed or performed in visit on 06/01/17   PAP (IMAGE GUIDED), LIQUID-BASED   Result Value Ref Range    Diagnosis Comment     Specimen adequacy Comment     Clinician provided ICD10 Comment     Performed by: Comment     QC reviewed by: Comment     . Rachana Blas Note: Comment     Test methodology Comment    HPV HIGH RISK, THIN PREP   Result Value Ref Range    HPV DNA Probe, High Risk Negative Negative       Assessment and Plan  Diagnoses and all orders for this visit:    1. Environmental allergies    2. Nasal congestion      OTC loratadine. OTC nasal spray for nasal congestion. After care summary printed and reviewed with patient. Plan reviewed with patient. Questions answered. Patient verbalized understanding of plan and is in agreement with plan. Patient to follow up as needed or earlier if symptoms worsen.      Ameya Rich, MELISSAP-C

## 2017-11-30 NOTE — MR AVS SNAPSHOT
Visit Information Date & Time Provider Department Dept. Phone Encounter #  
 11/30/2017  4:00 PM [place] MAGDIEL Schwarz [address] [phone] [id number] Follow-up Instructions Return in about 1 month (around 12/30/2017), or if symptoms worsen or fail to improve. Upcoming Health Maintenance Date Due DTaP/Tdap/Td series (1 - Tdap) 2/17/2008 Influenza Age 5 to Adult 8/1/2017 PAP AKA CERVICAL CYTOLOGY 6/1/2022 Allergies as of 11/30/2017  Review Complete On: 11/30/2017 By: Farhat Canales LPN No Known Allergies Current Immunizations  Never Reviewed No immunizations on file. Not reviewed this visit You Were Diagnosed With   
  
 Codes Comments Environmental allergies    -  Primary ICD-10-CM: Z91.09 
ICD-9-CM: V15.09 Nasal congestion     ICD-10-CM: R09.81 ICD-9-CM: 478.19 Vitals BP Pulse Temp Height(growth percentile) Weight(growth percentile) SpO2  
 134/81 86 98.4 °F (36.9 °C) (Oral) 5' 4\" (1.626 m) 141 lb (64 kg) 99% BMI OB Status Smoking Status 24.2 kg/m2 Having regular periods Never Smoker BMI and BSA Data Body Mass Index Body Surface Area  
 24.2 kg/m 2 1.7 m 2 Preferred Pharmacy Pharmacy Name Phone [facility] [address] [address] [phone] Your Updated Medication List  
  
   
This list is accurate as of: 11/30/17  4:39 PM.  Always use your most recent med list.  
  
  
  
  
 cyclobenzaprine 10 mg tablet Commonly known as:  FLEXERIL Take 1 Tab by mouth three (3) times daily as needed for Muscle Spasm(s). drospirenone-ethinyl estradiol 3-0.02 mg Tab Commonly known as:  Lesia Tavarez (28) Take 1 Tab by mouth daily. HYDROcodone-acetaminophen 5-325 mg per tablet Commonly known as:  Rolinda Doles Take 1 Tab by mouth every four (4) hours as needed. ibuprofen 800 mg tablet Commonly known as:  MOTRIN  
 Take 1 Tab by mouth every eight (8) hours as needed for Pain. Follow-up Instructions Return in about 1 month (around 12/30/2017), or if symptoms worsen or fail to improve. Patient Instructions Please contact our office if you have any questions about your visit today. Laryngitis: Care Instructions Your Care Instructions Laryngitis is an inflammation of the voice box (larynx) that causes your voice to become raspy or hoarse. It can be short-lived or long-lasting. Most of the time, laryngitis comes on quickly and lasts as long as 2 weeks. It is caused by overuse, irritation, or infection of the vocal cords inside the larynx. Some of the most common causes are a cold, the flu, or allergies. Loud talking, shouting, cheering, or singing also can cause laryngitis. Stomach acid that backs up into the throat also can make you lose your voice. Resting your voice and taking other steps at home can help you get your voice back. Follow-up care is a key part of your treatment and safety. Be sure to make and go to all appointments, and call your doctor if you are having problems. It's also a good idea to know your test results and keep a list of the medicines you take. How can you care for yourself at home? · Follow your doctor's directions for treating the condition that caused you to lose your voice. If your doctor prescribed antibiotics, take them as directed. Do not stop taking them just because you feel better. You need to take the full course of antibiotics. · Before you use cough and cold medicines, check the label. They may not be safe for young children or for people with certain health problems. · Try to keep stomach acid from backing up into your throat. Do not eat just before bedtime. Reduce the amount of coffee and alcohol you drink, and eat healthy foods.  Taking over-the-counter acid reducers can help when these steps are not enough. In some cases, you may need prescription medicine. · Rest your voice. You do not have to stop speaking, but use your voice as little as possible. Speak softly but do not whisper; whispering can bother your larynx more than speaking softly. Avoid talking on the telephone or trying to speak loudly. · Try not to clear your throat. This can cause more irritation of your larynx. Take an over-the-counter cough suppressant (if your doctor recommends it) if you have a dry cough that does not produce mucus. · Do not smoke or allow others to smoke around you. If you need help quitting, talk to your doctor about stop-smoking programs and medicines. These can increase your chances of quitting for good. · Use a humidifier or vaporizer to add moisture to your bedroom. Humidity helps to thin the mucus in the nasal membranes that causes stuffiness or postnasal drip. Follow the directions for cleaning the machine. · Drink plenty of fluids, enough so that your urine is light yellow or clear like water. If you have kidney, heart, or liver disease and have to limit fluids, talk with your doctor before you increase the amount of fluids you drink. · Use saline (saltwater) nasal washes to help keep your nasal passages open and wash out mucus and bacteria. You can buy saline nose drops at a grocery store or drugstore. Or, you can make your own at home by mixing ½ teaspoon salt, 1 cup water (at room temperature), and ½ teaspoon baking soda. If you make your own, fill a bulb syringe with the solution, insert the tip into your nostril, and squeeze gently. [place] your nose. When should you call for help? Call 911 anytime you think you may need emergency care. For example, call if: 
? · You have trouble breathing. ?Call your doctor now or seek immediate medical care if: 
? · You have new or worse pain. ? · You have trouble swallowing. ? Watch closely for changes in your health, and be sure to contact your doctor if: 
? · You do not get better as expected. Where can you learn more? Go to http://fadia-estefany.info/. Enter B178 in the search box to learn more about \"Laryngitis: Care Instructions. \" Current as of: May 12, 2017 Content Version: 11.4 © 4484-9781 [employer]. Care instructions adapted under license by [employer] (which disclaims liability or warranty for this information). If you have questions about a medical condition or this instruction, always ask your healthcare professional. [address] any warranty or liability for your use of this information. Strep Throat: Care Instructions Your Care Instructions Strep throat is a bacterial infection that causes sudden, severe sore throat and fever. Strep throat, which is caused by bacteria called streptococcus, is treated with antibiotics. Sometimes a strep test is necessary to tell if the sore throat is caused by strep bacteria. Treatment can help ease symptoms and may prevent future problems. Follow-up care is a key part of your treatment and safety. Be sure to make and go to all appointments, and call your doctor if you are having problems. It's also a good idea to know your test results and keep a list of the medicines you take. How can you care for yourself at home? · Take your antibiotics as directed. Do not stop taking them just because you feel better. You need to take the full course of antibiotics. · Strep throat can spread to others until 24 hours after you begin taking antibiotics. During this time, you should avoid contact with other people at work or home, especially infants and children. Do not sneeze or cough on others, and wash your hands often. Keep your drinking glass and eating utensils separate from those of others, and wash these items well in hot, soapy water.  
· Gargle with warm salt water at least once each hour to help reduce swelling and make your throat feel better. Use 1 teaspoon of salt mixed in 8 fluid ounces of warm water. · Take an over-the-counter pain medication, such as acetaminophen (Tylenol), ibuprofen (Advil, Motrin), or naproxen (Aleve). Read and follow all instructions on the label. · Try an over-the-counter anesthetic throat spray or throat lozenges, which may help relieve throat pain. · Drink plenty of fluids. Fluids may help soothe an irritated throat. Hot fluids, such as tea or soup, may help your throat feel better. · Eat soft solids and drink plenty of clear liquids. Flavored ice pops, ice cream, scrambled eggs, sherbet, and gelatin dessert (such as Jell-O) may also soothe the throat. · Get lots of rest. 
· Do not smoke, and avoid secondhand smoke. If you need help quitting, talk to your doctor about stop-smoking programs and medicines. These can increase your chances of quitting for good. · Use a vaporizer or humidifier to add moisture to the air in your bedroom. Follow the directions for cleaning the machine. When should you call for help? Call your doctor now or seek immediate medical care if: 
? · You have a new or higher fever. ? · You have a fever with a stiff neck or severe headache. ? · You have new or worse trouble swallowing. ? · Your sore throat gets much worse on one side. ? · Your pain becomes much worse on one side of your throat. ? Watch closely for changes in your health, and be sure to contact your doctor if: 
? · You are not getting better after 2 days (48 hours). ? · You do not get better as expected. Where can you learn more? Go to http://fadia-estefany.info/. Enter K625 in the search box to learn more about \"Strep Throat: Care Instructions. \" Current as of: May 12, 2017 Content Version: 11.4 © 4219-6194 [employer]Fu.  Care instructions adapted under license by [employer]-pins (which disclaims liability or warranty for this information). If you have questions about a medical condition or this instruction, always ask your healthcare professional. [address] any warranty or liability for your use of this information. Allergies: Care Instructions Your Care Instructions Allergies occur when your body's defense system (immune system) overreacts to certain substances. The immune system treats a harmless substance as if it were a harmful germ or virus. Many things can cause this overreaction, including pollens, medicine, food, dust, animal dander, and mold. Allergies can be mild or severe. Mild allergies can be managed with home treatment. But medicine may be needed to prevent problems. Managing your allergies is an important part of staying healthy. Your doctor may suggest that you have allergy testing to help find out what is causing your allergies. When you know what things trigger your symptoms, you can avoid them. This can prevent allergy symptoms and other health problems. For severe allergies that cause reactions that affect your whole body (anaphylactic reactions), your doctor may prescribe a shot of epinephrine to carry with you in case you have a severe reaction. Learn how to give yourself the shot and keep it with you at all times. Make sure it is not . Follow-up care is a key part of your treatment and safety. Be sure to make and go to all appointments, and call your doctor if you are having problems. It's also a good idea to know your test results and keep a list of the medicines you take. How can you care for yourself at home? · If you have been told by your doctor that dust or dust mites are causing your allergy, decrease the dust around your bed: 
[facility] sheets, pillowcases, and other bedding in hot water every week. ¨ Use dust-proof covers for pillows, duvets, and mattresses. Avoid plastic covers because they tear easily and do not \"breathe. \" Wash as instructed on the label. ¨ Do not use any blankets and pillows that you do not need. ¨ Use blankets that you can wash in your washing machine. ¨ Consider removing drapes and carpets, which attract and hold dust, from your bedroom. · If you are allergic to house dust and mites, do not use home humidifiers. Your doctor can suggest ways you can control dust and mites. · Look for signs of cockroaches. Cockroaches cause allergic reactions. Use cockroach baits to get rid of them. Then, clean your home well. Cockroaches like areas where grocery bags, newspapers, empty bottles, or cardboard boxes are stored. Do not keep these inside your home, and keep trash and food containers sealed. Seal off any spots where cockroaches might enter your home. · If you are allergic to mold, get rid of furniture, rugs, and drapes that smell musty. Check for mold in the bathroom. · If you are allergic to outdoor pollen or mold spores, use air-conditioning. Change or clean all filters every month. Keep windows closed. · If you are allergic to pollen, stay inside when pollen counts are high. Use a vacuum  with a HEPA filter or a double-thickness filter at least two times each week. · Stay inside when air pollution is bad. Avoid paint fumes, perfumes, and other strong odors. · Avoid conditions that make your allergies worse. Stay away from smoke. Do not smoke or let anyone else smoke in your house. Do not use fireplaces or wood-burning stoves. · If you are allergic to your pets, change the air filter in your furnace every month. Use high-efficiency filters. · If you are allergic to pet dander, keep pets outside or out of your bedroom. Old carpet and cloth furniture can hold a lot of animal dander. You may need to replace them. When should you call for help? Give an epinephrine shot if: 
? · You think you are having a severe allergic reaction. ? · You have symptoms in more than one body area, such as mild nausea and an itchy mouth. ?After giving an epinephrine shot call 911, even if you feel better. ?Call 911 if: 
? · You have symptoms of a severe allergic reaction. These may include: 
¨ Sudden raised, red areas (hives) all over your body. ¨ Swelling of the throat, mouth, lips, or tongue. ¨ Trouble breathing. ¨ Passing out (losing consciousness). Or you may feel very lightheaded or suddenly feel weak, confused, or restless. ? · You have been given an epinephrine shot, even if you feel better. ?Call your doctor now or seek immediate medical care if: 
? · You have symptoms of an allergic reaction, such as: ¨ A rash or hives (raised, red areas on the skin). ¨ Itching. ¨ Swelling. ¨ Belly pain, nausea, or vomiting. ? Watch closely for changes in your health, and be sure to contact your doctor if: 
? · You do not get better as expected. Where can you learn more? Go to http://fadia-estefany.info/. Enter T793 in the search box to learn more about \"Allergies: Care Instructions. \" Current as of: September 29, 2016 Content Version: 11.4 © 9330-1057 [employer]. Care instructions adapted under license by readfy (which disclaims liability or warranty for this information). If you have questions about a medical condition or this instruction, always ask your healthcare professional. [address] any warranty or liability for your use of this information. Introducing [facility]! Lauren Adair introduces [employer] patient portal. Now you can access parts of your medical record, email your doctor's office, and request medication refills online. 1. In your internet browser, go to [email or web address] "[email or web address]/[email or web address] 2. Click on the First Time User? Click Here link in the Sign In box. You will see the New Member Sign Up page. 3. Enter your [employer] Access Code exactly as it appears below.  You will not need to use this code after youve completed the sign-up process. If you do not sign up before the expiration date, you must request a new code. · Fivet[employer] Access Code: [email or web address] Expires: 2/28/2018  3:54 PM 
 
4. Enter the last four digits of your Social Security Number (xxxx) and Date of Birth (mm/dd/yyyy) as indicated and click Submit. You will be taken to the next sign-up page. 5. Create a Fivetran ID. This will be your Fivetran login ID and cannot be changed, so think of one that is secure and easy to remember. 6. Create a [employer]et[employer] password. You can change your password at any time. 7. Enter your Password Reset Question and Answer. This can be used at a later time if you forget your password. 8. Enter your e-mail address. You will receive e-mail notification when new information is available in [address]. 9. Click Sign Up. You can now view and download portions of your medical record. 10. Click the Download Summary menu link to download a portable copy of your medical information. If you have questions, please visit the Frequently Asked Questions section of the Fivetran website. Remember, [employer] is NOT to be used for urgent needs. For medical emergencies, dial 911. Now available from your iPhone and Android! Please provide this summary of care documentation to your next provider. Your primary care clinician is listed as David Ramos. If you have any questions after today's visit, please call [phone].

## 2017-11-30 NOTE — PATIENT INSTRUCTIONS
Please contact our office if you have any questions about your visit today. Laryngitis: Care Instructions  Your Care Instructions    Laryngitis is an inflammation of the voice box (larynx) that causes your voice to become raspy or hoarse. It can be short-lived or long-lasting. Most of the time, laryngitis comes on quickly and lasts as long as 2 weeks. It is caused by overuse, irritation, or infection of the vocal cords inside the larynx. Some of the most common causes are a cold, the flu, or allergies. Loud talking, shouting, cheering, or singing also can cause laryngitis. Stomach acid that backs up into the throat also can make you lose your voice. Resting your voice and taking other steps at home can help you get your voice back. Follow-up care is a key part of your treatment and safety. Be sure to make and go to all appointments, and call your doctor if you are having problems. It's also a good idea to know your test results and keep a list of the medicines you take. How can you care for yourself at home? · Follow your doctor's directions for treating the condition that caused you to lose your voice. If your doctor prescribed antibiotics, take them as directed. Do not stop taking them just because you feel better. You need to take the full course of antibiotics. · Before you use cough and cold medicines, check the label. They may not be safe for young children or for people with certain health problems. · Try to keep stomach acid from backing up into your throat. Do not eat just before bedtime. Reduce the amount of coffee and alcohol you drink, and eat healthy foods. Taking over-the-counter acid reducers can help when these steps are not enough. In some cases, you may need prescription medicine. · Rest your voice. You do not have to stop speaking, but use your voice as little as possible. Speak softly but do not whisper; whispering can bother your larynx more than speaking softly.  Avoid talking on the telephone or trying to speak loudly. · Try not to clear your throat. This can cause more irritation of your larynx. Take an over-the-counter cough suppressant (if your doctor recommends it) if you have a dry cough that does not produce mucus. · Do not smoke or allow others to smoke around you. If you need help quitting, talk to your doctor about stop-smoking programs and medicines. These can increase your chances of quitting for good. · Use a humidifier or vaporizer to add moisture to your bedroom. Humidity helps to thin the mucus in the nasal membranes that causes stuffiness or postnasal drip. Follow the directions for cleaning the machine. · Drink plenty of fluids, enough so that your urine is light yellow or clear like water. If you have kidney, heart, or liver disease and have to limit fluids, talk with your doctor before you increase the amount of fluids you drink. · Use saline (saltwater) nasal washes to help keep your nasal passages open and wash out mucus and bacteria. You can buy saline nose drops at a grocery store or drugstore. Or, you can make your own at home by mixing ½ teaspoon salt, 1 cup water (at room temperature), and ½ teaspoon baking soda. If you make your own, fill a bulb syringe with the solution, insert the tip into your nostril, and squeeze gently. Jose Angel Gerardo your nose. When should you call for help? Call 911 anytime you think you may need emergency care. For example, call if:  ? · You have trouble breathing. ?Call your doctor now or seek immediate medical care if:  ? · You have new or worse pain. ? · You have trouble swallowing. ? Watch closely for changes in your health, and be sure to contact your doctor if:  ? · You do not get better as expected. Where can you learn more? Go to http://fadia-estefany.info/. Enter K888 in the search box to learn more about \"Laryngitis: Care Instructions. \"  Current as of:  May 12, 2017  Content Version: 11.4  © 0441-3807 [employer]. Care instructions adapted under license by [employer] (which disclaims liability or warranty for this information). If you have questions about a medical condition or this instruction, always ask your healthcare professional. Sheri Adams any warranty or liability for your use of this information. Strep Throat: Care Instructions  Your Care Instructions    Strep throat is a bacterial infection that causes sudden, severe sore throat and fever. Strep throat, which is caused by bacteria called streptococcus, is treated with antibiotics. Sometimes a strep test is necessary to tell if the sore throat is caused by strep bacteria. Treatment can help ease symptoms and may prevent future problems. Follow-up care is a key part of your treatment and safety. Be sure to make and go to all appointments, and call your doctor if you are having problems. It's also a good idea to know your test results and keep a list of the medicines you take. How can you care for yourself at home? · Take your antibiotics as directed. Do not stop taking them just because you feel better. You need to take the full course of antibiotics. · Strep throat can spread to others until 24 hours after you begin taking antibiotics. During this time, you should avoid contact with other people at work or home, especially infants and children. Do not sneeze or cough on others, and wash your hands often. Keep your drinking glass and eating utensils separate from those of others, and wash these items well in hot, soapy water. · Gargle with warm salt water at least once each hour to help reduce swelling and make your throat feel better. Use 1 teaspoon of salt mixed in 8 fluid ounces of warm water. · Take an over-the-counter pain medication, such as acetaminophen (Tylenol), ibuprofen (Advil, Motrin), or naproxen (Aleve). Read and follow all instructions on the label.   · Try an over-the-counter anesthetic throat spray or throat lozenges, which may help relieve throat pain. · Drink plenty of fluids. Fluids may help soothe an irritated throat. Hot fluids, such as tea or soup, may help your throat feel better. · Eat soft solids and drink plenty of clear liquids. Flavored ice pops, ice cream, scrambled eggs, sherbet, and gelatin dessert (such as Jell-O) may also soothe the throat. · Get lots of rest.  · Do not smoke, and avoid secondhand smoke. If you need help quitting, talk to your doctor about stop-smoking programs and medicines. These can increase your chances of quitting for good. · Use a vaporizer or humidifier to add moisture to the air in your bedroom. Follow the directions for cleaning the machine. When should you call for help? Call your doctor now or seek immediate medical care if:  ? · You have a new or higher fever. ? · You have a fever with a stiff neck or severe headache. ? · You have new or worse trouble swallowing. ? · Your sore throat gets much worse on one side. ? · Your pain becomes much worse on one side of your throat. ? Watch closely for changes in your health, and be sure to contact your doctor if:  ? · You are not getting better after 2 days (48 hours). ? · You do not get better as expected. Where can you learn more? Go to http://fadia-estefany.info/. Enter K625 in the search box to learn more about \"Strep Throat: Care Instructions. \"  Current as of: May 12, 2017  Content Version: 11.4  © 1030-7032 [employer]. Care instructions adapted under license by [employer] (which disclaims liability or warranty for this information). If you have questions about a medical condition or this instruction, always ask your healthcare professional. [address] any warranty or liability for your use of this information.        Allergies: Care Instructions  Your Care Instructions    Allergies occur when your body's defense system (immune system) overreacts to certain substances. The immune system treats a harmless substance as if it were a harmful germ or virus. Many things can cause this overreaction, including pollens, medicine, food, dust, animal dander, and mold. Allergies can be mild or severe. Mild allergies can be managed with home treatment. But medicine may be needed to prevent problems. Managing your allergies is an important part of staying healthy. Your doctor may suggest that you have allergy testing to help find out what is causing your allergies. When you know what things trigger your symptoms, you can avoid them. This can prevent allergy symptoms and other health problems. For severe allergies that cause reactions that affect your whole body (anaphylactic reactions), your doctor may prescribe a shot of epinephrine to carry with you in case you have a severe reaction. Learn how to give yourself the shot and keep it with you at all times. Make sure it is not . Follow-up care is a key part of your treatment and safety. Be sure to make and go to all appointments, and call your doctor if you are having problems. It's also a good idea to know your test results and keep a list of the medicines you take. How can you care for yourself at home? · If you have been told by your doctor that dust or dust mites are causing your allergy, decrease the dust around your bed:  [facility] sheets, pillowcases, and other bedding in hot water every week. ¨ Use dust-proof covers for pillows, duvets, and mattresses. Avoid plastic covers because they tear easily and do not \"breathe. \" Wash as instructed on the label. ¨ Do not use any blankets and pillows that you do not need. ¨ Use blankets that you can wash in your washing machine. ¨ Consider removing drapes and carpets, which attract and hold dust, from your bedroom. · If you are allergic to house dust and mites, do not use home humidifiers.  Your doctor can suggest ways you can control dust and mites. · Look for signs of cockroaches. Cockroaches cause allergic reactions. Use cockroach baits to get rid of them. Then, clean your home well. Cockroaches like areas where grocery bags, newspapers, empty bottles, or cardboard boxes are stored. Do not keep these inside your home, and keep trash and food containers sealed. Seal off any spots where cockroaches might enter your home. · If you are allergic to mold, get rid of furniture, rugs, and drapes that smell musty. Check for mold in the bathroom. · If you are allergic to outdoor pollen or mold spores, use air-conditioning. Change or clean all filters every month. Keep windows closed. · If you are allergic to pollen, stay inside when pollen counts are high. Use a vacuum  with a HEPA filter or a double-thickness filter at least two times each week. · Stay inside when air pollution is bad. Avoid paint fumes, perfumes, and other strong odors. · Avoid conditions that make your allergies worse. Stay away from smoke. Do not smoke or let anyone else smoke in your house. Do not use fireplaces or wood-burning stoves. · If you are allergic to your pets, change the air filter in your furnace every month. Use high-efficiency filters. · If you are allergic to pet dander, keep pets outside or out of your bedroom. Old carpet and cloth furniture can hold a lot of animal dander. You may need to replace them. When should you call for help? Give an epinephrine shot if:  ? · You think you are having a severe allergic reaction. ? · You have symptoms in more than one body area, such as mild nausea and an itchy mouth. ? After giving an epinephrine shot call 911, even if you feel better. ?Call 911 if:  ? · You have symptoms of a severe allergic reaction. These may include:  ¨ Sudden raised, red areas (hives) all over your body. ¨ Swelling of the throat, mouth, lips, or tongue. ¨ Trouble breathing. ¨ Passing out (losing consciousness).  Or you may feel very lightheaded or suddenly feel weak, confused, or restless. ? · You have been given an epinephrine shot, even if you feel better. ?Call your doctor now or seek immediate medical care if:  ? · You have symptoms of an allergic reaction, such as:  ¨ A rash or hives (raised, red areas on the skin). ¨ Itching. ¨ Swelling. ¨ Belly pain, nausea, or vomiting. ? Watch closely for changes in your health, and be sure to contact your doctor if:  ? · You do not get better as expected. Where can you learn more? Go to http://fadia-estefany.info/. Enter M977 in the search box to learn more about \"Allergies: Care Instructions. \"  Current as of: September 29, 2016  Content Version: 11.4  © 2001-3463 [employer]. Care instructions adapted under license by [employer] (which disclaims liability or warranty for this information). If you have questions about a medical condition or this instruction, always ask your healthcare professional. [place] [address] any warranty or liability for your use of this information.

## 2017-12-08 RX ORDER — DROSPIRENONE AND ETHINYL ESTRADIOL 0.02-3(28)
KIT ORAL
Qty: 28 TAB | Refills: 12 | Status: SHIPPED | OUTPATIENT
Start: 2017-12-08 | End: 2019-02-15 | Stop reason: SDUPTHER

## 2018-05-01 ENCOUNTER — OFFICE VISIT (OUTPATIENT)
Dept: FAMILY MEDICINE CLINIC | Age: 33
End: 2018-05-01

## 2018-05-01 VITALS
DIASTOLIC BLOOD PRESSURE: 80 MMHG | HEART RATE: 91 BPM | HEIGHT: 64 IN | BODY MASS INDEX: 24.59 KG/M2 | WEIGHT: 144 LBS | SYSTOLIC BLOOD PRESSURE: 113 MMHG | TEMPERATURE: 98.9 F | RESPIRATION RATE: 16 BRPM

## 2018-05-01 DIAGNOSIS — F32.A DEPRESSION, UNSPECIFIED DEPRESSION TYPE: Primary | ICD-10-CM

## 2018-05-01 RX ORDER — FLUOXETINE 20 MG/1
20 TABLET ORAL DAILY
Qty: 30 TAB | Refills: 5 | Status: SHIPPED | OUTPATIENT
Start: 2018-05-01 | End: 2018-06-01 | Stop reason: SDUPTHER

## 2018-05-01 RX ORDER — NAPROXEN SODIUM 220 MG
220 TABLET ORAL 2 TIMES DAILY WITH MEALS
COMMUNITY
End: 2018-06-22 | Stop reason: ALTCHOICE

## 2018-05-01 NOTE — PROGRESS NOTES
Angel Wolff 34 y.o. female   Chief Complaint   Patient presents with    Behavioral Problem     Patient states she has difficulty remembering things, and her spouse notices that she looses focus easily.  Depression     Patient states symtpoms have been present for a year now. Patient denies any thoughts of harming herself. 1. Have you been to the ER, urgent care clinic since your last visit? Hospitalized since your last visit? No    2. Have you seen or consulted any other health care providers outside of the [address] [place] [address] since your last visit? Include any pap smears or colon screening.  No

## 2018-05-01 NOTE — PROGRESS NOTES
HISTORY OF PRESENT ILLNESS  Angel Wolff is a 34 y.o. female. Chief Complaint   Patient presents with    Behavioral Problem     Patient states she has difficulty remembering things, and her spouse notices that she looses focus easily.  Depression     Patient states symtpoms have been present for a year now. Patient denies any thoughts of harming herself. HPI  Patient is here with a C/O of Depression that has been present for about a year now. Pt reports decreased energy, excessive sleep, anhedonia, wt gain. She feels like she cant get away from stress because of all the issues at work and at home.  (there are stressors at work related to a toxic coworker; there are stressors at home related to a child custody issue.)   Pt also has decreased focus. She has lost 4 credit cards and has had to replace them. She lost car keys recently as well. Pt denies SI/HI. Patient states she has difficulty remembering things, and that her spouse reports she looses focus easily.        PHQ over the last two weeks 5/1/2018   Little interest or pleasure in doing things More than half the days   Feeling down, depressed or hopeless Several days   Total Score PHQ 2 3   Trouble falling or staying asleep, or sleeping too much More than half the days   Feeling tired or having little energy Nearly every day   Poor appetite or overeating More than half the days   Feeling bad about yourself - or that you are a failure or have let yourself or your family down Several days   Trouble concentrating on things such as school, work, reading or watching TV Several days   Moving or speaking so slowly that other people could have noticed; or the opposite being so fidgety that others notice Not at all   Thoughts of being better off dead, or hurting yourself in some way Not at all   PHQ 9 Score 12   How difficult have these problems made it for you to do your work, take care of your home and get along with others Somewhat difficult Patient also completed the Adult ADHD ASRS symptom checklist.    Review of Systems   Constitutional: Negative. HENT: Negative. Respiratory: Negative. Cardiovascular: Negative. Psychiatric/Behavioral: Positive for depression. Negative for suicidal ideas. All other systems reviewed and are negative. Physical Exam  Physical Exam   Nursing note and vitals reviewed. Constitutional: She is oriented to person, place, and time. She appears well-developed and well-nourished. HENT:   Head: Normocephalic and atraumatic. Right Ear: External ear normal.   Left Ear: External ear normal.   Nose: Nose normal.   Eyes: Conjunctivae and EOM are normal.   Neck: Normal range of motion. Neck supple. No JVD present. Carotid bruit is not present. No thyromegaly present. Cardiovascular: Normal rate, regular rhythm, normal heart sounds and intact distal pulses. Exam reveals no gallop and no friction rub. No murmur heard. Pulmonary/Chest: Effort normal and breath sounds normal. She has no wheezes. She has no rhonchi. She has no rales. Abdominal: Soft. Bowel sounds are normal.   Musculoskeletal: Normal range of motion. Neurological: She is alert and oriented to person, place, and time. Coordination normal.   Skin: Skin is warm and dry. Psychiatric: She has a normal mood and affect. Her behavior is normal. Judgment and thought content normal.     ASSESSMENT and PLAN  Diagnoses and all orders for this visit:    1. Depression, unspecified depression type  -     FLUoxetine (PROZAC) 20 mg tablet; Take 1 Tab by mouth daily.  -     REFERRAL TO PSYCHOLOGY    over 25 min spent with pt. Greater than 50% of this time spent in counseling. Current stressors explored in depth. Treatment options discussed in detail. Pt agrees with tx plan.      Follow-up Disposition: 2-4 wks; sooner prn

## 2018-05-31 PROBLEM — F32.A MILD DEPRESSION: Status: ACTIVE | Noted: 2018-05-31

## 2018-06-01 ENCOUNTER — OFFICE VISIT (OUTPATIENT)
Dept: FAMILY MEDICINE CLINIC | Age: 33
End: 2018-06-01

## 2018-06-01 ENCOUNTER — HOSPITAL ENCOUNTER (OUTPATIENT)
Dept: LAB | Age: 33
Discharge: HOME | End: 2018-06-01

## 2018-06-01 VITALS
TEMPERATURE: 97.6 F | RESPIRATION RATE: 20 BRPM | BODY MASS INDEX: 24.24 KG/M2 | SYSTOLIC BLOOD PRESSURE: 133 MMHG | HEART RATE: 121 BPM | OXYGEN SATURATION: 97 % | DIASTOLIC BLOOD PRESSURE: 97 MMHG | WEIGHT: 142 LBS | HEIGHT: 64 IN

## 2018-06-01 DIAGNOSIS — R03.0 ELEVATED BP WITHOUT DIAGNOSIS OF HYPERTENSION: Primary | ICD-10-CM

## 2018-06-01 DIAGNOSIS — R00.0 TACHYCARDIA: ICD-10-CM

## 2018-06-01 DIAGNOSIS — F32.A MILD DEPRESSION: ICD-10-CM

## 2018-06-01 PROCEDURE — 99001 SPECIMEN HANDLING PT-LAB: CPT | Performed by: FAMILY MEDICINE

## 2018-06-01 RX ORDER — METOPROLOL SUCCINATE 25 MG/1
25 TABLET, EXTENDED RELEASE ORAL DAILY
Qty: 30 TAB | Refills: 5 | Status: SHIPPED | OUTPATIENT
Start: 2018-06-01 | End: 2019-09-19

## 2018-06-01 RX ORDER — FLUOXETINE 20 MG/1
40 TABLET ORAL DAILY
Qty: 60 TAB | Refills: 5 | Status: SHIPPED | OUTPATIENT
Start: 2018-06-01 | End: 2018-07-09

## 2018-06-01 NOTE — PROGRESS NOTES
HISTORY OF PRESENT ILLNESS  Toyin Anderson is a 34 y.o. female. Chief Complaint   Patient presents with    Blood Pressure Check     elevated x 1 week.  Irregular Heart Beat     elevated pulse readings    Dizziness     patient reports having more than one episode.  Arm Pain     left arm pain with pain that travels down the arm. Patient states that she is under the care Workers comp for the left arm pain. HPI  Pt had elev bp at her Worker's Comp doctor on 5/20/18. She has been tracking at home and notes that her diastolic bp has been elevated and her pulse has been over 100. She has been drinking lots of water to stay hydrated. Pt reports that her sleep is unchanged because her shoulder pain keeps her awake at night. She feels that the anhedonia has improved. She has started to exercise again. Her focus has not improved. She is tolerating the prozac without any difficulties. Review of Systems   Constitutional: Negative. HENT: Negative. Respiratory: Negative. Cardiovascular: Positive for palpitations. Psychiatric/Behavioral: Positive for depression. Negative for suicidal ideas. All other systems reviewed and are negative. Physical Exam  Physical Exam   Nursing note and vitals reviewed. Constitutional: She is oriented to person, place, and time. She appears well-developed and well-nourished. HENT:   Head: Normocephalic and atraumatic. Right Ear: External ear normal.   Left Ear: External ear normal.   Nose: Nose normal.   Eyes: Conjunctivae and EOM are normal.   Neck: Normal range of motion. Neck supple. No JVD present. Carotid bruit is not present. No thyromegaly present. Cardiovascular: Normal rate, regular rhythm, normal heart sounds and intact distal pulses. Exam reveals no gallop and no friction rub. No murmur heard. Pulmonary/Chest: Effort normal and breath sounds normal. She has no wheezes. She has no rhonchi. She has no rales. Abdominal: Soft.  Bowel sounds are normal.   Musculoskeletal: Normal range of motion. Neurological: She is alert and oriented to person, place, and time. Coordination normal.   Skin: Skin is warm and dry. Psychiatric: She has a normal mood and affect. Her behavior is normal. Judgment and thought content normal.     ASSESSMENT and PLAN  Diagnoses and all orders for this visit:    1. Elevated BP without diagnosis of hypertension  -     METABOLIC PANEL, COMPREHENSIVE; Future  -     CBC WITH AUTOMATED DIFF; Future  -     TSH 3RD GENERATION; Future  -     AMB POC URINALYSIS DIP STICK AUTO W/O MICRO  -     metoprolol succinate (TOPROL-XL) 25 mg XL tablet; Take 1 Tab by mouth daily. 2. Tachycardia  -     METABOLIC PANEL, COMPREHENSIVE; Future  -     CBC WITH AUTOMATED DIFF; Future  -     TSH 3RD GENERATION; Future  -     AMB POC URINALYSIS DIP STICK AUTO W/O MICRO  -     metoprolol succinate (TOPROL-XL) 25 mg XL tablet; Take 1 Tab by mouth daily. 3. Mild depression (HCC)  -     FLUoxetine (PROZAC) 20 mg tablet; Take 2 Tabs by mouth daily.       Follow-up Disposition: keep 6/4/18 appt for f/u

## 2018-06-01 NOTE — PROGRESS NOTES
Ericka Lawler 34 y.o. female   Chief Complaint   Patient presents with    Blood Pressure Check     elevated x 1 week.  Irregular Heart Beat     elevated pulse readings    Dizziness     patient reports having more than one episode.  Arm Pain     left arm pain with pain that travels down the arm. Patient states that she is under the care Workers comp for the left arm pain. 1. Have you been to the ER, urgent care clinic since your last visit? Hospitalized since your last visit? No    2. Have you seen or consulted any other health care providers outside of the [address] [place] [address] since your last visit? Include any pap smears or colon screening.  No

## 2018-06-02 LAB
ALBUMIN SERPL-MCNC: 4.6 G/DL (ref 3.5–5.5)
ALBUMIN/GLOB SERPL: 2.1 {RATIO} (ref 1.2–2.2)
ALP SERPL-CCNC: 60 IU/L (ref 39–117)
ALT SERPL-CCNC: 25 IU/L (ref 0–32)
AST SERPL-CCNC: 26 IU/L (ref 0–40)
BASOPHILS # BLD AUTO: 0 X10E3/UL (ref 0–0.2)
BASOPHILS NFR BLD AUTO: 0 %
BILIRUB SERPL-MCNC: 0.7 MG/DL (ref 0–1.2)
BUN SERPL-MCNC: 9 MG/DL (ref 6–20)
BUN/CREAT SERPL: 11 (ref 9–23)
CALCIUM SERPL-MCNC: 9.7 MG/DL (ref 8.7–10.2)
CHLORIDE SERPL-SCNC: 99 MMOL/L (ref 96–106)
CO2 SERPL-SCNC: 28 MMOL/L (ref 18–29)
CREAT SERPL-MCNC: 0.81 MG/DL (ref 0.57–1)
EOSINOPHIL # BLD AUTO: 0.1 X10E3/UL (ref 0–0.4)
EOSINOPHIL NFR BLD AUTO: 1 %
ERYTHROCYTE [DISTWIDTH] IN BLOOD BY AUTOMATED COUNT: 13.3 % (ref 12.3–15.4)
GFR SERPLBLD CREATININE-BSD FMLA CKD-EPI: 112 ML/MIN/1.73
GFR SERPLBLD CREATININE-BSD FMLA CKD-EPI: 97 ML/MIN/1.73
GLOBULIN SER CALC-MCNC: 2.2 G/DL (ref 1.5–4.5)
GLUCOSE SERPL-MCNC: 87 MG/DL (ref 65–99)
HCT VFR BLD AUTO: 46.6 % (ref 34–46.6)
HGB BLD-MCNC: 15.7 G/DL (ref 11.1–15.9)
IMM GRANULOCYTES # BLD: 0 X10E3/UL (ref 0–0.1)
IMM GRANULOCYTES NFR BLD: 0 %
LYMPHOCYTES # BLD AUTO: 3.3 X10E3/UL (ref 0.7–3.1)
LYMPHOCYTES NFR BLD AUTO: 27 %
MCH RBC QN AUTO: 31 PG (ref 26.6–33)
MCHC RBC AUTO-ENTMCNC: 33.7 G/DL (ref 31.5–35.7)
MCV RBC AUTO: 92 FL (ref 79–97)
MONOCYTES # BLD AUTO: 0.9 X10E3/UL (ref 0.1–0.9)
MONOCYTES NFR BLD AUTO: 7 %
NEUTROPHILS # BLD AUTO: 7.8 X10E3/UL (ref 1.4–7)
NEUTROPHILS NFR BLD AUTO: 65 %
PLATELET # BLD AUTO: 328 X10E3/UL (ref 150–379)
POTASSIUM SERPL-SCNC: 4.2 MMOL/L (ref 3.5–5.2)
PROT SERPL-MCNC: 6.8 G/DL (ref 6–8.5)
RBC # BLD AUTO: 5.07 X10E6/UL (ref 3.77–5.28)
SODIUM SERPL-SCNC: 139 MMOL/L (ref 134–144)
TSH SERPL DL<=0.005 MIU/L-ACNC: 0.94 UIU/ML (ref 0.45–4.5)
WBC # BLD AUTO: 12 X10E3/UL (ref 3.4–10.8)

## 2018-06-04 ENCOUNTER — OFFICE VISIT (OUTPATIENT)
Dept: FAMILY MEDICINE CLINIC | Age: 33
End: 2018-06-04

## 2018-06-04 VITALS
HEIGHT: 64 IN | HEART RATE: 96 BPM | BODY MASS INDEX: 24.24 KG/M2 | TEMPERATURE: 98.4 F | WEIGHT: 142 LBS | SYSTOLIC BLOOD PRESSURE: 131 MMHG | RESPIRATION RATE: 16 BRPM | DIASTOLIC BLOOD PRESSURE: 82 MMHG

## 2018-06-04 DIAGNOSIS — F32.A DEPRESSION, UNSPECIFIED DEPRESSION TYPE: ICD-10-CM

## 2018-06-04 DIAGNOSIS — R03.0 ELEVATED BP WITHOUT DIAGNOSIS OF HYPERTENSION: ICD-10-CM

## 2018-06-04 DIAGNOSIS — R00.0 TACHYCARDIA: Primary | ICD-10-CM

## 2018-06-04 NOTE — PROGRESS NOTES
HISTORY OF PRESENT ILLNESS  Jacki Mckeon is a 34 y.o. female. Chief Complaint   Patient presents with    Follow-up    Depression       HPI  Patient is here for a f/u for depression and elevated bp. Pt notes that her bp has been similar to today's reading over the weekend at home. Her pulse has been under 100. She is not having palpitations as often. Pt was not able to get the increased dose of prozac at her pharmacy. Review of Systems   Constitutional: Negative. HENT: Negative. Respiratory: Negative. Cardiovascular: Negative. Psychiatric/Behavioral: Positive for depression. All other systems reviewed and are negative. Physical Exam  Physical Exam   Nursing note and vitals reviewed. Constitutional: She is oriented to person, place, and time. She appears well-developed and well-nourished. HENT:   Head: Normocephalic and atraumatic. Right Ear: External ear normal.   Left Ear: External ear normal.   Nose: Nose normal.   Eyes: Conjunctivae and EOM are normal.   Neck: Normal range of motion. Neck supple. No JVD present. Carotid bruit is not present. No thyromegaly present. Cardiovascular: Normal rate, regular rhythm, normal heart sounds and intact distal pulses. Exam reveals no gallop and no friction rub. No murmur heard. Pulmonary/Chest: Effort normal and breath sounds normal. She has no wheezes. She has no rhonchi. She has no rales. Abdominal: Soft. Bowel sounds are normal.   Musculoskeletal: Normal range of motion. Neurological: She is alert and oriented to person, place, and time. Coordination normal.   Skin: Skin is warm and dry. Psychiatric: She has a normal mood and affect. Her behavior is normal. Judgment and thought content normal.     ASSESSMENT and PLAN  Diagnoses and all orders for this visit:    1. Tachycardia  Improved. Cont beta blocker.      2. Elevated BP without diagnosis of hypertension  Improved but not at goal.  Will have pt cont toprol xl 25mg daily x one week. Pt to call with readings at that time. Will increase to 50mg every day if not closer to goal.     3. Depression, unspecified depression type  No change in meds after last visit; will contact pharmacy to understand what happened. Intention is for pt to be on prozac 40mg every day.      Follow-up Disposition: 1 month; sooner prn

## 2018-06-04 NOTE — PROGRESS NOTES
Chief Complaint   Patient presents with    Follow-up    Depression     1. Have you been to the ER, urgent care clinic since your last visit? Hospitalized since your last visit? No    2. Have you seen or consulted any other health care providers outside of the [address] [place] [address] since your last visit? Include any pap smears or colon screening.  No

## 2018-06-07 ENCOUNTER — TELEPHONE (OUTPATIENT)
Dept: FAMILY MEDICINE CLINIC | Age: 33
End: 2018-06-07

## 2018-06-07 DIAGNOSIS — R00.0 TACHYCARDIA: Primary | ICD-10-CM

## 2018-06-07 DIAGNOSIS — R03.0 ELEVATED BP WITHOUT DIAGNOSIS OF HYPERTENSION: ICD-10-CM

## 2018-06-07 NOTE — TELEPHONE ENCOUNTER
Pt called and stated that heart rate is still elevated 101 and BP was 111/97. Please advise. Pt stated that she is still taking medication but BP has not decreased.

## 2018-06-12 NOTE — TELEPHONE ENCOUNTER
Spoke with patient and she states that her BP readings have been 120/90 with HR of 115, and 120/91 HR 90.

## 2018-06-20 ENCOUNTER — TELEPHONE (OUTPATIENT)
Dept: FAMILY MEDICINE CLINIC | Age: 33
End: 2018-06-20

## 2018-06-20 NOTE — TELEPHONE ENCOUNTER
Pt went to [facility] yesterday,they put her on light duty for work,pt asking if dr rendon would do something more specific--as dr Jose Enrique Ramirez was aware of her problem, I can see dr rendon referred gher to cardiology

## 2018-06-21 NOTE — TELEPHONE ENCOUNTER
Per DR. Penn the patient was notified that cardiology will need to determine her work status with her appointment that is scheduled for 6-22-18.

## 2018-06-22 ENCOUNTER — OFFICE VISIT (OUTPATIENT)
Dept: CARDIOLOGY CLINIC | Age: 33
End: 2018-06-22

## 2018-06-22 VITALS
BODY MASS INDEX: 22.88 KG/M2 | HEIGHT: 64 IN | HEART RATE: 99 BPM | OXYGEN SATURATION: 98 % | SYSTOLIC BLOOD PRESSURE: 121 MMHG | WEIGHT: 134 LBS | DIASTOLIC BLOOD PRESSURE: 89 MMHG

## 2018-06-22 DIAGNOSIS — R00.2 PALPITATIONS: Primary | ICD-10-CM

## 2018-06-22 NOTE — PROGRESS NOTES
[facility]    Ms. Brayan Mosher is a 34year old female with history of hypertension. Ms. Brayan Mosher is asked to come see me for the initial evaluation. Ms. Brayan Mosher denies any prior history of myocardial infarction, congestive heart failure. She denies any PND or lower extremity swelling. 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 She said that she performed regularly exercise up until a couple of weeks ago. She could go on a treadmill for about 2-3 miles without any symptoms. She said for the last four to six weeks she has been experiencing palpitations, especially in the morning time, where she feels like her heart is pounding and racing. Her heart rate without any activity would go as high as 110 bpm.  Occasionally she feels dizzy along with these symptoms. She denies any prior history of syncopal episode. She denies any resting or exertional chest pain or chest tightness to be concerned of angina. She denies PND or lower extremity swelling. She does complain she has probably lost about 10 pounds unintentionally over the last six to eight months. She denies any diarrhea or excessive sweating. She denies any other specific symptoms at this time. Denies any nausea, vomiting, abdominal pain, fever, chills, sputum production. No hematuria or other bleeding complaints    Past Medical History:   Diagnosis Date    Concussion 10/4/2010    HTN (hypertension)     Migraine          No past surgical history on file. Current Outpatient Prescriptions   Medication Sig    FLUoxetine (PROZAC) 20 mg tablet Take 2 Tabs by mouth daily.  metoprolol succinate (TOPROL-XL) 25 mg XL tablet Take 1 Tab by mouth daily. (Patient taking differently: Take 75 mg by mouth daily.)    GIANVI, 28, 3-0.02 mg tab TAKE ONE TABLET BY MOUTH ONE TIME DAILY      No current facility-administered medications for this visit.         Allergies and Sensitivities:  No Known Allergies    Family History:  Family History   Problem Relation Age of Onset    Other Maternal Grandfather      accidental electrocution    Hypertension Mother     Diabetes Maternal Grandmother        Social History:  Social History   Substance Use Topics    Smoking status: Never Smoker    Smokeless tobacco: Never Used    Alcohol use Yes      Comment: due to recent pregnancy patient has stopped all alcohol drinking/ now on occassion     She  reports that she has never smoked. She has never used smokeless tobacco.  She  reports that she drinks alcohol. Review of Systems:  Cardiac symptoms as noted above in HPI. All others negative. Denies skin rash, joint pain, blurring vision, photophobia, neck pain, hemoptysis, chronic cough, nausea, vomiting, hematuria, burning micturition, BRBPR, chronic headaches. Physical Exam:  BP Readings from Last 3 Encounters:   06/22/18 121/89   06/04/18 131/82   06/01/18 (!) 133/97         Pulse Readings from Last 3 Encounters:   06/22/18 99   06/04/18 96   06/01/18 (!) 121          Wt Readings from Last 3 Encounters:   06/22/18 134 lb (60.8 kg)   06/04/18 142 lb (64.4 kg)   06/01/18 142 lb (64.4 kg)       Constitutional: Oriented to person, place, and time. HENT: Head: Normocephalic and atraumatic. Neck: No JVD present. Carotid bruit is not appreciated. Cardiovascular: Regular rhythm. No murmur, gallop or rubs appreciated  Lung: Breath sounds normal. No respiratory distress. No ronchi or rales appreciated  Abdominal: No tenderness. No rebound and no guarding. Musculoskeletal: There is no lower extremity edema. No cynosis  Lymphadenopathy:  No cervical or supraclavicular adenopathy appriciated. Neurological: No gross motor deficit noted. Skin: No visible skin rash noted. No Ear discharge noted  Psychiatric: Normal mood and affect.    Good distal pulse    Review of Data  LABS:   Lab Results   Component Value Date/Time    Sodium 139 06/01/2018 02:04 PM    Potassium 4.2 06/01/2018 02:04 PM    Chloride 99 06/01/2018 02:04 PM    CO2 28 06/01/2018 02:04 PM    Glucose 87 06/01/2018 02:04 PM    BUN 9 06/01/2018 02:04 PM    Creatinine 0.81 06/01/2018 02:04 PM     No flowsheet data found. Lab Results   Component Value Date/Time    ALT (SGPT) 25 06/01/2018 02:04 PM     No results found for: HBA1C, HGBE8, BQX1EDKI, HMP3LYMP    EKG  (06/18) Sinus rhythm 89 bpm. NO ST changes of ischemia. Normal CA interval    ECHO    IMPRESSION & PLAN:  Ms. Marcio Hull is a 34year old female with a history of hypertension. Hypertension:  Her blood pressure today is 121/89 mmHg. Currently she is on Toprol XL 75 mg daily. I would recommend to continue same. An echocardiogram has been ordered to rule out hypertensive cardiovascular heart disease, especially because of her palpitations and dizziness to rule out hypertensive cardiovascular heart disease. Tachycardia:  Ms. Marcio Hull has been experiencing some palpitations on and off for the last four to six weeks. On EKG, which was performed last week, heart rate was within acceptable range. There were no ST changes of ischemia. CA interval was normal.  She has on and off palpitations lasting 1-2 minutes for the last four to six weeks that happens probably once or twice a week. There is no syncopal episode. I will place Holter monitor for 48 hours to see if she has any arrhythmia. She is already on Toprol XL. I recommend to continue same. Her TSH was checked by PCP two weeks ago and was within normal range. I will make further recommendation based on Holter monitor and echocardiogram findings. This plan was discussed with patient who is in agreement. Thank you for allowing me to participate in patient care. Please feel free to call me if you have any question or concern. Jeffrey Yousif MD  Please note: This document has been produced using voice recognition software.  Unrecognized errors in transcription may be present.

## 2018-06-22 NOTE — PATIENT INSTRUCTIONS
Mackenzie Oliveira will call to schedule your testing within 24 hours. If you do not hear from her, then please call her directly at [phone].

## 2018-06-22 NOTE — PROGRESS NOTES
1. Have you been to the ER, urgent care clinic since your last visit? Hospitalized since your last visit? Np_ [facility]- palpitations and htn   2. Have you seen or consulted any other health care providers outside of the [address] [place] [address] since your last visit? Include any pap smears or colon screening.  NP - No

## 2018-06-22 NOTE — MR AVS SNAPSHOT
[address] [facility] [address] [address] 
[phone] Patient: Alejandra Weeks MRN: [id number] :1985 Visit Information Date & Time Provider Department Dept. Phone Encounter #  
 2018  2:30 PM Jesus Burk MD [address] [place] [address] Specialist at [facility] [phone] [id number] Follow-up Instructions Return in about 4 weeks (around 2018). Your Appointments 2018 11:15 AM  
Follow Up with Steph Peña MD  
[facility] (--) Appt Note: 1 month follow up Sheri [address] [place] [id number] [phone]  
  
   
 [address] [place] [id number]  
  
    
 2018  2:45 PM  
Follow Up with Louise Stein MD  
Cardio Specialist at [facility]/[address] [facility]) Appt Note: after holter echo  
 [facility] [address] [address] [address] [place] [address] Upcoming Health Maintenance Date Due DTaP/Tdap/Td series (1 - Tdap) 2008 Influenza Age 5 to Adult 2018 PAP AKA CERVICAL CYTOLOGY 2022 Allergies as of 2018  Review Complete On: 2018 By: Li Berkowitz LPN No Known Allergies Current Immunizations  Never Reviewed No immunizations on file. Not reviewed this visit You Were Diagnosed With   
  
 Codes Comments Palpitations    -  Primary ICD-10-CM: R00.2 ICD-9-CM: 785.1 Vitals BP Pulse Height(growth percentile) Weight(growth percentile) SpO2 BMI  
 121/89 99 5' 4\" (1.626 m) 134 lb (60.8 kg) 98% 23 kg/m2 OB Status Smoking Status Having regular periods Never Smoker Vitals History BMI and BSA Data Body Mass Index Body Surface Area  
 23 kg/m 2 1.66 m 2 Preferred Pharmacy Pharmacy Name Phone [address] [facility], [address] [address] [phone] Your Updated Medication List  
  
   
This list is accurate as of 6/22/18  3:04 PM.  Always use your most recent med list.  
  
  
  
  
 FLUoxetine 20 mg tablet Commonly known as:  PROzac Take 2 Tabs by mouth daily. GIANVI (28) 3-0.02 mg Tab Generic drug:  drospirenone-ethinyl estradiol TAKE ONE TABLET BY MOUTH ONE TIME DAILY  
  
 metoprolol succinate 25 mg XL tablet Commonly known as:  TOPROL-XL Take 1 Tab by mouth daily. Follow-up Instructions Return in about 4 weeks (around 7/20/2018). Patient Instructions Yajaira Sandoval will call to schedule your testing within 24 hours. If you do not hear from her, then please call her directly at [phone]. Introducing [facility]! [facility] introduces First To File patient portal. Now you can access parts of your medical record, email your doctor's office, and request medication refills online. 1. In your internet browser, go to [email or web address] 2. Click on the First Time User? Click Here link in the Sign In box. You will see the New Member Sign Up page. 3. Enter your First To File Access Code exactly as it appears below. You will not need to use this code after youve completed the sign-up process. If you do not sign up before the expiration date, you must request a new code. · First To File Access Code: [email or web address] Expires: 9/20/2018  3:04 PM 
 
4. Enter the last four digits of your Social Security Number (xxxx) and Date of Birth (mm/dd/yyyy) as indicated and click Submit. You will be taken to the next sign-up page. 5. Create a [employer] ID. This will be your First To File login ID and cannot be changed, so think of one that is secure and easy to remember. 6. Create a First To File password. You can change your password at any time. 7. Enter your Password Reset Question and Answer. This can be used at a later time if you forget your password. 8. Enter your e-mail address. You will receive e-mail notification when new information is available in [address] 9. Click Sign Up. You can now view and download portions of your medical record. 10. Click the Download Summary menu link to download a portable copy of your medical information. If you have questions, please visit the Frequently Asked Questions section of the [employer] website. Remember, [employer] is NOT to be used for urgent needs. For medical emergencies, dial 911. Now available from your iPhone and Android! Please provide this summary of care documentation to your next provider. Your primary care clinician is listed as Joaquin Ramon. If you have any questions after today's visit, please call [phone].

## 2018-06-22 NOTE — LETTER
NOTIFICATION OF RETURN TO WORK / SCHOOL 
 
6/22/2018 3:02 PM 
 
Ms. Abran Chinchilla [address] [address] [address] [facility] [place] To Whom It May Concern: 
 
Abran Chinchilla was under the care of [address]  On June 22, 2018. She will be able to return to work with the following restrictions: Do not lift more than 20lbs. Avoid excessive heat If there are questions or concerns please have the patient contact our office. Sincerely, Margarito Curtis MD

## 2018-07-02 ENCOUNTER — HOSPITAL ENCOUNTER (OUTPATIENT)
Dept: NON INVASIVE DIAGNOSTICS | Age: 33
Discharge: HOME | End: 2018-07-02
Attending: INTERNAL MEDICINE
Payer: COMMERCIAL

## 2018-07-02 VITALS
SYSTOLIC BLOOD PRESSURE: 121 MMHG | BODY MASS INDEX: 22.88 KG/M2 | WEIGHT: 134 LBS | DIASTOLIC BLOOD PRESSURE: 89 MMHG | HEIGHT: 64 IN

## 2018-07-02 DIAGNOSIS — R00.2 PALPITATIONS: ICD-10-CM

## 2018-07-02 LAB
ECHO LA AREA 4C: 12.8 CM2
ECHO LA VOL 2C: 26.86 ML (ref 22–52)
ECHO LA VOL 4C: 27.96 ML (ref 22–52)
ECHO LA VOLUME INDEX A2C: 16.28 ML/M2
ECHO LA VOLUME INDEX A4C: 16.95 ML/M2
ECHO LV E' LATERAL VELOCITY: 10.19 CM/S
ECHO LV INTERNAL DIMENSION DIASTOLIC: 3.94 CM (ref 3.9–5.3)
ECHO LV INTERNAL DIMENSION SYSTOLIC: 2.66 CM
ECHO LV IVSD: 0.91 CM (ref 0.6–0.9)
ECHO LV MASS 2D: 122.7 G (ref 67–162)
ECHO LV MASS INDEX 2D: 74.4 G/M2
ECHO LV POSTERIOR WALL DIASTOLIC: 0.92 CM (ref 0.6–0.9)
ECHO LVOT DIAM: 1.89 CM
ECHO LVOT PEAK GRADIENT: 3.9 MMHG
ECHO LVOT PEAK VELOCITY: 98.23 CM/S
ECHO LVOT VTI: 17.59 CM
ECHO MV A VELOCITY: 60.8 CM/S
ECHO MV E DECELERATION TIME (DT): 241.9 MS
ECHO MV E VELOCITY: 0.66 CM/S
ECHO MV E/A RATIO: 1.1
ECHO MV E/E' LATERAL: 6.4
ECHO RV INTERNAL DIMENSION: 2.31 CM

## 2018-07-02 PROCEDURE — 93225 XTRNL ECG REC<48 HRS REC: CPT

## 2018-07-02 PROCEDURE — 93306 TTE W/DOPPLER COMPLETE: CPT

## 2018-07-09 ENCOUNTER — OFFICE VISIT (OUTPATIENT)
Dept: FAMILY MEDICINE CLINIC | Age: 33
End: 2018-07-09

## 2018-07-09 VITALS
HEIGHT: 64 IN | RESPIRATION RATE: 18 BRPM | HEART RATE: 81 BPM | TEMPERATURE: 98.9 F | BODY MASS INDEX: 23.05 KG/M2 | WEIGHT: 135 LBS | SYSTOLIC BLOOD PRESSURE: 100 MMHG | DIASTOLIC BLOOD PRESSURE: 70 MMHG

## 2018-07-09 DIAGNOSIS — F32.A DEPRESSION, UNSPECIFIED DEPRESSION TYPE: Primary | ICD-10-CM

## 2018-07-09 DIAGNOSIS — R00.0 TACHYCARDIA: ICD-10-CM

## 2018-07-09 DIAGNOSIS — F41.9 ANXIETY: ICD-10-CM

## 2018-07-09 RX ORDER — BUSPIRONE HYDROCHLORIDE 7.5 MG/1
7.5 TABLET ORAL 2 TIMES DAILY
Qty: 60 TAB | Refills: 5 | Status: SHIPPED | OUTPATIENT
Start: 2018-07-09 | End: 2019-09-19

## 2018-07-09 RX ORDER — FLUOXETINE HYDROCHLORIDE 40 MG/1
40 CAPSULE ORAL DAILY
Qty: 30 CAP | Refills: 5 | Status: SHIPPED | OUTPATIENT
Start: 2018-07-09 | End: 2019-09-19

## 2018-07-09 RX ORDER — METOPROLOL SUCCINATE 50 MG/1
50 TABLET, EXTENDED RELEASE ORAL DAILY
Qty: 30 TAB | Refills: 5 | Status: SHIPPED | OUTPATIENT
Start: 2018-07-09 | End: 2019-09-19

## 2018-07-09 NOTE — PROGRESS NOTES
Chief Complaint   Patient presents with    Follow-up     1 month f/u    Irregular Heart Beat     Patient seen by Cardiology DR. Ravin Neil. 1. Have you been to the ER, urgent care clinic since your last visit? Hospitalized since your last visit? No    2. Have you seen or consulted any other health care providers outside of the [address] [place] [address] since your last visit? Include any pap smears or colon screening.  Yes cardiology

## 2018-07-09 NOTE — PATIENT INSTRUCTIONS
Please contact our office if you have any questions about your visit today. Recovering From Depression: Care Instructions Your Care Instructions Taking good care of yourself is important as you recover from depression. In time, your symptoms will fade as your treatment takes hold. Do not give up. Instead, focus your energy on getting better. Your mood will improve. It just takes some time. Focus on things that can help you feel better, such as being with friends and family, eating well, and getting enough rest. But take things slowly. Do not do too much too soon. You will begin to feel better gradually. Follow-up care is a key part of your treatment and safety. Be sure to make and go to all appointments, and call your doctor if you are having problems. It's also a good idea to know your test results and keep a list of the medicines you take. How can you care for yourself at home? Be realistic · If you have a large task to do, break it up into smaller steps you can handle, and just do what you can. · You may want to put off important decisions until your depression has lifted. If you have plans that will have a major impact on your life, such as marriage, divorce, or a job change, try to wait a bit. Talk it over with friends and loved ones who can help you look at the overall picture first. 
· Reaching out to people for help is important. Do not isolate yourself. Let your family and friends help you. Find someone you can trust and confide in, and talk to that person. · Be patient, and be kind to yourself. Remember that depression is not your fault and is not something you can overcome with willpower alone. Treatment is necessary for depression, just like for any other illness. Feeling better takes time, and your mood will improve little by little. Stay active · Stay busy and get outside. Take a walk, or try some other light exercise.  
· Talk with your doctor about an exercise program. Exercise can help with mild depression. · Go to a movie or concert. Take part in a Holiness activity or other social gathering. Go to a ball game. · Ask a friend to have dinner with you. Take care of yourself · Eat a balanced diet with plenty of fresh fruits and vegetables, whole grains, and lean protein. If you have lost your appetite, eat small snacks rather than large meals. · Avoid drinking alcohol or using illegal drugs. Do not take medicines that have not been prescribed for you. They may interfere with medicines you may be taking for depression, or they may make your depression worse. · Take your medicines exactly as they are prescribed. You may start to feel better within 1 to 3 weeks of taking antidepressant medicine. But it can take as many as 6 to 8 weeks to see more improvement. If you have questions or concerns about your medicines, or if you do not notice any improvement by 3 weeks, talk to your doctor. · If you have any side effects from your medicine, tell your doctor. Antidepressants can make you feel tired, dizzy, or nervous. Some people have dry mouth, constipation, headaches, sexual problems, or diarrhea. Many of these side effects are mild and will go away on their own after you have been taking the medicine for a few weeks. Some may last longer. Talk to your doctor if side effects are bothering you too much. You might be able to try a different medicine. · Get enough sleep. If you have problems sleeping: ¨ Go to bed at the same time every night, and get up at the same time every morning. ¨ Keep your bedroom dark and quiet. ¨ Do not exercise after 5:00 p.m. ¨ Avoid drinks with caffeine after 5:00 p.m. · Avoid sleeping pills unless they are prescribed by the doctor treating your depression. Sleeping pills may make you groggy during the day, and they may interact with other medicine you are taking.  
· If you have any other illnesses, such as diabetes, heart disease, or high blood pressure, make sure to continue with your treatment. Tell your doctor about all of the medicines you take, including those with or without a prescription. · Keep the numbers for these national suicide hotlines: 7-757-171-TALK (8-506.597.5007) and 3-649-VRCZEED (2-116.777.5996). If you or someone you know talks about suicide or feeling hopeless, get help right away. When should you call for help? Call 911 anytime you think you may need emergency care. For example, call if: 
? · You feel like hurting yourself or someone else. ? · Someone you know has depression and is about to attempt or is attempting suicide. ?Call your doctor now or seek immediate medical care if: 
? · You hear voices. ? · Someone you know has depression and: 
¨ Starts to give away his or her possessions. ¨ Uses illegal drugs or drinks alcohol heavily. ¨ Talks or writes about death, including writing suicide notes or talking about guns, knives, or pills. ¨ Starts to spend a lot of time alone. ¨ Acts very aggressively or suddenly appears calm. ? Watch closely for changes in your health, and be sure to contact your doctor if: 
? · You do not get better as expected. Where can you learn more? Go to http://fadia-estefany.info/. Enter R128 in the search box to learn more about \"Recovering From Depression: Care Instructions. \" Current as of: May 12, 2017 Content Version: 11.4 © 1810-0075 Healthwise, [employer]. Care instructions adapted under license by [employer] (which disclaims liability or warranty for this information). If you have questions about a medical condition or this instruction, always ask your healthcare professional. [place] [address] any warranty or liability for your use of this information.

## 2018-07-09 NOTE — PROGRESS NOTES
HISTORY OF PRESENT ILLNESS  Wyatt Bradford is a 34 y.o. female. Chief Complaint   Patient presents with    Follow-up     1 month f/u    Irregular Heart Beat     Patient seen by Cardiology DR. Robbin Silver.  Depression     Patient states that the depression has not improved with the medication. HPI  Patient is here for a  follow up of Depression, and tachycardia. Patient was evaluated by Cardiology DR. Robbin Silver and was put on light duty until her tests are all done. Her job did not give her light duty but instead is requiring her to use her leave time. 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 Pt has had her echo and holter. She is waiting for results. Cards did tell her this could all be related to anxiety. Patient states that she feels like her depression is worse due to being home a lot. Pt has not yet seen a therapist.   Patient denies any thoughts of suicide. Patient does not need medication refills today. New concerns today: NONE      Review of Systems   Constitutional: Negative. HENT: Negative. Respiratory: Negative. Cardiovascular: Negative. Psychiatric/Behavioral: Positive for depression. The patient is nervous/anxious. All other systems reviewed and are negative. Physical Exam  Physical Exam   Nursing note and vitals reviewed. Constitutional: She is oriented to person, place, and time. She appears well-developed and well-nourished. HENT:   Head: Normocephalic and atraumatic. Right Ear: External ear normal.   Left Ear: External ear normal.   Nose: Nose normal.   Eyes: Conjunctivae and EOM are normal.   Neck: Normal range of motion. Neck supple. No JVD present. Carotid bruit is not present. No thyromegaly present. Cardiovascular: Normal rate, regular rhythm, normal heart sounds and intact distal pulses. Exam reveals no gallop and no friction rub. No murmur heard. Pulmonary/Chest: Effort normal and breath sounds normal. She has no wheezes. She has no rhonchi. She has no rales. Abdominal: Soft. Bowel sounds are normal.   Musculoskeletal: Normal range of motion. Neurological: She is alert and oriented to person, place, and time. Coordination normal.   Skin: Skin is warm and dry. Psychiatric: She has a normal mood and affect. Her behavior is normal. Judgment and thought content normal.     ASSESSMENT and PLAN  Diagnoses and all orders for this visit:    1. Depression, unspecified depression type  2. Anxiety  -     FLUoxetine (PROZAC) 40 mg capsule; Take 1 Cap by mouth daily.  -   Add:   busPIRone (BUSPAR) 7.5 mg tablet; Take 1 Tab by mouth two (2) times a day. 3. Tachycardia  -     metoprolol succinate (TOPROL-XL) 50 mg XL tablet; Take 1 Tab by mouth daily. Care as per cards. Follow-up Disposition: pt will return for completion of fmla paperwork at the earliest available appt.

## 2018-07-12 ENCOUNTER — OFFICE VISIT (OUTPATIENT)
Dept: FAMILY MEDICINE CLINIC | Age: 33
End: 2018-07-12

## 2018-07-12 VITALS
TEMPERATURE: 98.5 F | BODY MASS INDEX: 23.05 KG/M2 | RESPIRATION RATE: 18 BRPM | WEIGHT: 135 LBS | HEART RATE: 83 BPM | HEIGHT: 64 IN | DIASTOLIC BLOOD PRESSURE: 79 MMHG | SYSTOLIC BLOOD PRESSURE: 112 MMHG

## 2018-07-12 DIAGNOSIS — R00.0 TACHYCARDIA: Primary | ICD-10-CM

## 2018-07-12 DIAGNOSIS — I10 HYPERTENSION, UNSPECIFIED TYPE: ICD-10-CM

## 2018-07-12 DIAGNOSIS — Z02.79 MEDICAL CERTIFICATE ISSUANCE: ICD-10-CM

## 2018-07-12 DIAGNOSIS — R00.2 PALPITATIONS: ICD-10-CM

## 2018-07-12 NOTE — PROGRESS NOTES
Chief Complaint   Patient presents with    Form Completion     FMLA      1. Have you been to the ER, urgent care clinic since your last visit? Hospitalized since your last visit? No    2. Have you seen or consulted any other health care providers outside of the [address] [place] [address] since your last visit? Include any pap smears or colon screening.  No

## 2018-07-12 NOTE — PATIENT INSTRUCTIONS
Please contact our office if you have any questions about your visit today. Recovering From Depression: Care Instructions  Your Care Instructions    Taking good care of yourself is important as you recover from depression. In time, your symptoms will fade as your treatment takes hold. Do not give up. Instead, focus your energy on getting better. Your mood will improve. It just takes some time. Focus on things that can help you feel better, such as being with friends and family, eating well, and getting enough rest. But take things slowly. Do not do too much too soon. You will begin to feel better gradually. Follow-up care is a key part of your treatment and safety. Be sure to make and go to all appointments, and call your doctor if you are having problems. It's also a good idea to know your test results and keep a list of the medicines you take. How can you care for yourself at home? Be realistic  · If you have a large task to do, break it up into smaller steps you can handle, and just do what you can. · You may want to put off important decisions until your depression has lifted. If you have plans that will have a major impact on your life, such as marriage, divorce, or a job change, try to wait a bit. Talk it over with friends and loved ones who can help you look at the overall picture first.  · Reaching out to people for help is important. Do not isolate yourself. Let your family and friends help you. Find someone you can trust and confide in, and talk to that person. · Be patient, and be kind to yourself. Remember that depression is not your fault and is not something you can overcome with willpower alone. Treatment is necessary for depression, just like for any other illness. Feeling better takes time, and your mood will improve little by little. Stay active  · Stay busy and get outside. Take a walk, or try some other light exercise.   · Talk with your doctor about an exercise program. Exercise can help with mild depression. · Go to a movie or concert. Take part in a Orthodoxy activity or other social gathering. Go to a ball game. · Ask a friend to have dinner with you. Take care of yourself  · Eat a balanced diet with plenty of fresh fruits and vegetables, whole grains, and lean protein. If you have lost your appetite, eat small snacks rather than large meals. · Avoid drinking alcohol or using illegal drugs. Do not take medicines that have not been prescribed for you. They may interfere with medicines you may be taking for depression, or they may make your depression worse. · Take your medicines exactly as they are prescribed. You may start to feel better within 1 to 3 weeks of taking antidepressant medicine. But it can take as many as 6 to 8 weeks to see more improvement. If you have questions or concerns about your medicines, or if you do not notice any improvement by 3 weeks, talk to your doctor. · If you have any side effects from your medicine, tell your doctor. Antidepressants can make you feel tired, dizzy, or nervous. Some people have dry mouth, constipation, headaches, sexual problems, or diarrhea. Many of these side effects are mild and will go away on their own after you have been taking the medicine for a few weeks. Some may last longer. Talk to your doctor if side effects are bothering you too much. You might be able to try a different medicine. · Get enough sleep. If you have problems sleeping:  ¨ Go to bed at the same time every night, and get up at the same time every morning. ¨ Keep your bedroom dark and quiet. ¨ Do not exercise after 5:00 p.m. ¨ Avoid drinks with caffeine after 5:00 p.m. · Avoid sleeping pills unless they are prescribed by the doctor treating your depression. Sleeping pills may make you groggy during the day, and they may interact with other medicine you are taking.   · If you have any other illnesses, such as diabetes, heart disease, or high blood pressure, make sure to continue with your treatment. Tell your doctor about all of the medicines you take, including those with or without a prescription. · Keep the numbers for these national suicide hotlines: 7-858-186-TALK (7-600.599.3173) and 2-186-KERISVE (3-605.526.4375). If you or someone you know talks about suicide or feeling hopeless, get help right away. When should you call for help? Call 911 anytime you think you may need emergency care. For example, call if:    · You feel like hurting yourself or someone else.     · Someone you know has depression and is about to attempt or is attempting suicide.   [facility] your doctor now or seek immediate medical care if:    · You hear voices.     · Someone you know has depression and:  ¨ Starts to give away his or her possessions. ¨ Uses illegal drugs or drinks alcohol heavily. ¨ Talks or writes about death, including writing suicide notes or talking about guns, knives, or pills. ¨ Starts to spend a lot of time alone. ¨ Acts very aggressively or suddenly appears calm.    Watch closely for changes in your health, and be sure to contact your doctor if:    · You do not get better as expected. Where can you learn more? Go to http://fadia-estefany.info/. Enter P267 in the search box to learn more about \"Recovering From Depression: Care Instructions. \"  Current as of: December 7, 2017  Content Version: 11.7  © 7880-7554 [employer]. Care instructions adapted under license by [employer] (which disclaims liability or warranty for this information). If you have questions about a medical condition or this instruction, always ask your healthcare professional. [address] any warranty or liability for your use of this information.

## 2018-07-12 NOTE — PROGRESS NOTES
Chief Complaint   Patient presents with    Form Completion     [facility]      HISTORY OF PRESENT ILLNESS  Shruthi High is a 34 y.o. female. HPI  Pt here for completion of la paperwork. She has been out of work since cardiology put her on light duty until her cardiology eval is complete. She was finally able to return to work as a light duty assignment has been approved for her recently. Pt reports that cards was concerned about the strenuous nature of her job as a  and felt that this could exacerbate her tachycardia and palpitations. She had not previously had issues with elevated bp. However, she is now normotensive on her current tx regimen. Review of Systems   Constitutional: Negative. HENT: Negative. Respiratory: Negative. Cardiovascular: Positive for palpitations. Negative for chest pain. All other systems reviewed and are negative. Physical Exam  Physical Exam   Nursing note and vitals reviewed. Constitutional: She is oriented to person, place, and time. She appears well-developed and well-nourished. HENT:   Head: Normocephalic and atraumatic. Right Ear: External ear normal.   Left Ear: External ear normal.   Nose: Nose normal.   Eyes: Conjunctivae and EOM are normal.   Neck: Normal range of motion. Neck supple. No JVD present. Carotid bruit is not present. No thyromegaly present. Cardiovascular: Normal rate, regular rhythm, normal heart sounds and intact distal pulses. Exam reveals no gallop and no friction rub. No murmur heard. Pulmonary/Chest: Effort normal and breath sounds normal. She has no wheezes. She has no rhonchi. She has no rales. Abdominal: Soft. Bowel sounds are normal.   Musculoskeletal: Normal range of motion. Neurological: She is alert and oriented to person, place, and time. Coordination normal.   Skin: Skin is warm and dry. Psychiatric: She has a normal mood and affect.  Her behavior is normal. Judgment and thought content normal.     ASSESSMENT and PLAN  Diagnoses and all orders for this visit:    1. Tachycardia  2. Hypertension, unspecified type  3. Palpitations  Stable, cont pres tx plan. Cards eval in progress. 4. Medical certificate issuance  Forms completed, returned to pt. Follow-up Disposition: as previously planned.

## 2018-07-24 ENCOUNTER — OFFICE VISIT (OUTPATIENT)
Dept: CARDIOLOGY CLINIC | Age: 33
End: 2018-07-24

## 2018-07-24 VITALS
OXYGEN SATURATION: 98 % | SYSTOLIC BLOOD PRESSURE: 103 MMHG | BODY MASS INDEX: 23.05 KG/M2 | DIASTOLIC BLOOD PRESSURE: 74 MMHG | HEART RATE: 82 BPM | WEIGHT: 135 LBS | HEIGHT: 64 IN

## 2018-07-24 DIAGNOSIS — R00.2 PALPITATIONS: Primary | ICD-10-CM

## 2018-07-24 NOTE — MR AVS SNAPSHOT
[facility] [address] [facility] [address] [id number] 
[phone] Patient: Twan Townsend MRN: [id number] :1985 Visit Information Date & Time Provider Department Dept. Phone Encounter #  
 2018  2:45 PM Jesus Oglesby MD [facility] [phone] [id number] Follow-up Instructions Return in about 1 year (around 2019). Your Appointments 2018 12:45 PM  
Follow Up with Kendra Chaidez MD  
[facility] (--) Appt Note: kati  
 [address] [place] [id number] [phone]  
  
   
 [address] [facility] [address] Upcoming Health Maintenance Date Due DTaP/Tdap/Td series (1 - Tdap) 2008 Influenza Age 5 to Adult 2018 PAP AKA CERVICAL CYTOLOGY 2022 Allergies as of 2018  Review Complete On: 2018 By: Herbie Spears RN No Known Allergies Current Immunizations  Never Reviewed No immunizations on file. Not reviewed this visit Vitals BP Pulse Height(growth percentile) Weight(growth percentile) LMP SpO2  
 103/74 82 5' 4\" (1.626 m) 135 lb (61.2 kg) 2018 98% BMI OB Status Smoking Status 23.17 kg/m2 Having regular periods Never Smoker BMI and BSA Data Body Mass Index Body Surface Area  
 23.17 kg/m 2 1.66 m 2 Preferred Pharmacy Pharmacy Name Phone [address] [place] [address] [phone] Your Updated Medication List  
  
   
This list is accurate as of 18  3:36 PM.  Always use your most recent med list.  
  
  
  
  
 busPIRone 7.5 mg tablet Commonly known as:  BUSPAR Take 1 Tab by mouth two (2) times a day. FLUoxetine 40 mg capsule Commonly known as:  PROzac Take 1 Cap by mouth daily. GIANVI (28) 3-0.02 mg Tab Generic drug:  drospirenone-ethinyl estradiol TAKE ONE TABLET BY MOUTH ONE TIME DAILY * metoprolol succinate 25 mg XL tablet Commonly known as:  TOPROL-XL Take 1 Tab by mouth daily. * metoprolol succinate 50 mg XL tablet Commonly known as:  TOPROL-XL Take 1 Tab by mouth daily. * Notice: This list has 2 medication(s) that are the same as other medications prescribed for you. Read the directions carefully, and ask your doctor or other care provider to review them with you. Follow-up Instructions Return in about 1 year (around 7/24/2019). Patient Instructions Start taking Toprol 50 mg daily F/U in 1 year Introducing [facility] SERVICES! Poonam Chun introduces [employer] patient portal. Now you can access parts of your medical record, email your doctor's office, and request medication refills online. 1. In your internet browser, go to [email or web address] 2. Click on the First Time User? Click Here link in the Sign In box. You will see the New Member Sign Up page. 3. Enter your [employer] Access Code exactly as it appears below. You will not need to use this code after youve completed the sign-up process. If you do not sign up before the expiration date, you must request a new code. · Y[employer]un Access Code: [email or web address] Expires: 9/20/2018  3:04 PM 
 
4. Enter the last four digits of your Social Security Number (xxxx) and Date of Birth (mm/dd/yyyy) as indicated and click Submit. You will be taken to the next sign-up page. 5. Create a Yun Yun ID. This will be your Y[employer] Yun login ID and cannot be changed, so think of one that is secure and easy to remember. 6. Create a Yun Yun password. You can change your password at any time. 7. Enter your Password Reset Question and Answer. This can be used at a later time if you forget your password. 8. Enter your e-mail address. You will receive e-mail notification when new information is available in [address] 9. Click Sign Up. You can now view and download portions of your medical record. 10. Click the Download Summary menu link to download a portable copy of your medical information. If you have questions, please visit the Frequently Asked Questions section of the [employer] website. Remember, [employer] is NOT to be used for urgent needs. For medical emergencies, dial 911. Now available from your iPhone and Android! Please provide this summary of care documentation to your next provider. Your primary care clinician is listed as Manuel Mccray. If you have any questions after today's visit, please call [phone].

## 2018-07-24 NOTE — PROGRESS NOTES
[facility]    Ms. Elda Okeefe is a 34 y. o.female with history of hypertension. Ms. Elda Okeefe recently established care due to palpitations, noting HR up to ~110 even without activity. She reported some occasional associated dizziness. She had noted 10-pound unintentional weight loss over the past 6-8 months. She returns to the office today for follow-up after recent testing. She has had no recent chest pain/tightness, PND, or LE swelling. She has noted some fatigue after being put on Toprol and Buspar. She thinks that all her symptoms are related to working with co-worker. She has been on different duty since last time away from co-worker and her symptoms have been much better. No nausea, vomiting, abdominal pain, fever, chills, sputum production. No hematuria or other bleeding complaints    Past Medical History:   Diagnosis Date    Concussion 10/4/2010    HTN (hypertension)     Migraine          No past surgical history on file. Current Outpatient Prescriptions   Medication Sig    metoprolol succinate (TOPROL-XL) 50 mg XL tablet Take 1 Tab by mouth daily.  FLUoxetine (PROZAC) 40 mg capsule Take 1 Cap by mouth daily.  busPIRone (BUSPAR) 7.5 mg tablet Take 1 Tab by mouth two (2) times a day.  metoprolol succinate (TOPROL-XL) 25 mg XL tablet Take 1 Tab by mouth daily. (Patient taking differently: Take 50 mg by mouth daily.)    GIANVI, 28, 3-0.02 mg tab TAKE ONE TABLET BY MOUTH ONE TIME DAILY      No current facility-administered medications for this visit.         Allergies and Sensitivities:  No Known Allergies    Family History:  Family History   Problem Relation Age of Onset    Other Maternal Grandfather      accidental electrocution    Hypertension Mother     Diabetes Maternal Grandmother        Social History:  Social History   Substance Use Topics    Smoking status: Never Smoker    Smokeless tobacco: Never Used    Alcohol use Yes      Comment: due to recent pregnancy patient has stopped all alcohol drinking/ now on occassion     She  reports that she has never smoked. She has never used smokeless tobacco.  She  reports that she drinks alcohol. Review of Systems:  Cardiac symptoms as noted above in HPI. All others negative. Denies skin rash, blurring vision, photophobia, neck pain, hemoptysis, chronic cough, nausea, vomiting, hematuria, burning micturition, BRBPR, chronic headaches. Physical Exam:  BP Readings from Last 3 Encounters:   07/24/18 103/74   07/12/18 112/79   07/09/18 100/70         Pulse Readings from Last 3 Encounters:   07/24/18 82   07/12/18 83   07/09/18 81          Wt Readings from Last 3 Encounters:   07/24/18 135 lb (61.2 kg)   07/12/18 135 lb (61.2 kg)   07/09/18 135 lb (61.2 kg)       Constitutional: Oriented to person, place, and time. HENT: Head: Normocephalic and atraumatic. Neck: No JVD present. Cardiovascular: Regular rhythm. No murmur, gallop or rubs appreciated  Lung: Breath sounds normal. No respiratory distress. No rhonchi or rales appreciated  Abdominal: No tenderness. No rebound and no guarding. Musculoskeletal: There is no lower extremity edema. No cyanosis    Review of Data  LABS:   Lab Results   Component Value Date/Time    Sodium 139 06/01/2018 02:04 PM    Potassium 4.2 06/01/2018 02:04 PM    Chloride 99 06/01/2018 02:04 PM    CO2 28 06/01/2018 02:04 PM    Glucose 87 06/01/2018 02:04 PM    BUN 9 06/01/2018 02:04 PM    Creatinine 0.81 06/01/2018 02:04 PM     No flowsheet data found. Lab Results   Component Value Date/Time    ALT (SGPT) 25 06/01/2018 02:04 PM     No results found for: HBA1C, HGBE8, SDN9UGAI, BUH5QYRN, DGT4OVCZ    EKG  (06/18) Sinus rhythm 89 bpm. NO ST changes of ischemia. Normal MI interval    ECHO (07/2018)  Left Ventricle  Normal cavity size, wall thickness, systolic function (ejection fraction normal) and diastolic function.  The estimated ejection fraction is 56 - 60%. No regional wall motion abnormality noted. Left Atrium  Normal cavity size. The volume is normal. LA volume index is 19 ml/m2. Right Ventricle  Normal cavity size and global systolic function. Right Atrium  Normal size. Aortic Valve  Normal valve structure, no stenosis and no regurgitation. Mitral Valve  Normal valve structure, no stenosis and no regurgitation. No stenosis. Tricuspid Valve  Normal valve structure and no stenosis. Trace tricuspid valve regurgitation. Pulmonic Valve  The pulmonic valve was not well visualized. Trace regurgitation. Aorta  Normal aortic root. IVC/Hepatic Veins  Normal structure. Normal central venous pressure (0-5 mmHg); IVC diameter is less than 21 mm and collapses more than 50% with respiration. Pericardium  No evidence of pericardial effusion. IMPRESSION & PLAN:  Ms. Sarah Topete is a 34 y.o. female with a history of hypertension. Hypertension:  Her blood pressure today is 103/74 mmHg. Currently she is on Toprol XL 75 mg daily, will decrease to 50 mg daily as she has noted some fatigue on Toprol. Echocardiogram 07/2018 with normal EF, no significant valvular disease. Tachycardia:  Ms. Sarah Topete has been experiencing some intermittent palpitations however much better since last time and she thinks could be because of being away from a particular co-worker. Her TSH was checked by PCP recently and was within normal range. Holter monitor had a triggered event with underlying sinus rhythm. There were 3 single VE's and 12 SVE's. Decreasing Toprol dose as noted above because of fatigue. This plan was discussed with patient who is in agreement. Thank you for allowing me to participate in patient care. Please feel free to call me if you have any question or concern. Pt was advised to follow-up in 1 year. David Powell MD  Please note: This document has been produced using voice recognition software.  Unrecognized errors in transcription may be present.

## 2018-07-25 ENCOUNTER — TELEPHONE (OUTPATIENT)
Dept: CARDIOLOGY CLINIC | Age: 33
End: 2018-07-25

## 2018-07-25 NOTE — TELEPHONE ENCOUNTER
Incoming from pt . Two patient Identifiers confirmed. 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 Letter needs to state why she was off full duty and that she is released for full duty status. Pt stated letter could be faxed [phone] Attn: Adama Styles.

## 2018-07-25 NOTE — LETTER
NOTIFICATION RETURN TO WORK / SCHOOL 
 
7/26/2018 Ms. Deb Crowe [address] [address] To Whom It May Concern: 
 
Deb Crowe is currently under the care of [facility] AT [address] [place] [address]. She will return to work/school on: July 27, 2018 to full duty. If there are questions or concerns please have the patient contact our office. Sincerely, Nely White MD

## 2018-07-25 NOTE — TELEPHONE ENCOUNTER
Incoming from pt. Two patient Identifiers confirmed. 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 Advised I would consult with provider for approval of letter. Advised he was not in office today but would be back in office tomorrow. She stated she would like letter faxed but would have to call back and give fax number tomorrow.

## 2018-07-26 NOTE — TELEPHONE ENCOUNTER
Contacted pt at number. Two patient Identifiers confirmed. Verbal order and read back per Rocio Lennon MD He will agree to stated pt is released to full duty but not disclose why she was out of duty per Simran Tucker. Pt verbalized understanding. Faxed letter to number listed below with confirmation of receipt.

## 2019-01-16 ENCOUNTER — APPOINTMENT (OUTPATIENT)
Dept: GENERAL RADIOLOGY | Age: 34
End: 2019-01-16
Attending: EMERGENCY MEDICINE
Payer: OTHER MISCELLANEOUS

## 2019-01-16 ENCOUNTER — APPOINTMENT (OUTPATIENT)
Dept: CT IMAGING | Age: 34
End: 2019-01-16
Attending: EMERGENCY MEDICINE
Payer: OTHER MISCELLANEOUS

## 2019-01-16 ENCOUNTER — HOSPITAL ENCOUNTER (EMERGENCY)
Age: 34
Discharge: HOME | End: 2019-01-16
Attending: EMERGENCY MEDICINE
Payer: OTHER MISCELLANEOUS

## 2019-01-16 VITALS
SYSTOLIC BLOOD PRESSURE: 122 MMHG | DIASTOLIC BLOOD PRESSURE: 83 MMHG | WEIGHT: 140 LBS | HEIGHT: 64 IN | TEMPERATURE: 99.1 F | OXYGEN SATURATION: 100 % | RESPIRATION RATE: 16 BRPM | BODY MASS INDEX: 23.9 KG/M2 | HEART RATE: 75 BPM
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
## 2019-01-16 DIAGNOSIS — S09.90XA CLOSED HEAD INJURY, INITIAL ENCOUNTER: ICD-10-CM

## 2019-01-16 DIAGNOSIS — S80.02XA CONTUSION OF LEFT KNEE, INITIAL ENCOUNTER: Primary | ICD-10-CM

## 2019-01-16 PROCEDURE — 99283 EMERGENCY DEPT VISIT LOW MDM: CPT

## 2019-01-16 PROCEDURE — 73564 X-RAY EXAM KNEE 4 OR MORE: CPT

## 2019-01-16 PROCEDURE — 70450 CT HEAD/BRAIN W/O DYE: CPT

## 2019-01-16 PROCEDURE — 74011250637 HC RX REV CODE- 250/637: Performed by: EMERGENCY MEDICINE

## 2019-01-16 PROCEDURE — 71045 X-RAY EXAM CHEST 1 VIEW: CPT

## 2019-01-16 RX ORDER — CYCLOBENZAPRINE HCL 5 MG
10 TABLET ORAL
Qty: 9 TAB | Refills: 0 | Status: SHIPPED | OUTPATIENT
Start: 2019-01-16 | End: 2019-09-19

## 2019-01-16 RX ORDER — KETOROLAC TROMETHAMINE 10 MG/1
10 TABLET, FILM COATED ORAL ONCE
Status: COMPLETED | OUTPATIENT
Start: 2019-01-16 | End: 2019-01-16

## 2019-01-16 RX ORDER — NAPROXEN 500 MG/1
500 TABLET ORAL 2 TIMES DAILY WITH MEALS
Qty: 20 TAB | Refills: 0 | Status: SHIPPED | OUTPATIENT
Start: 2019-01-16 | End: 2019-09-19

## 2019-01-16 RX ORDER — KETOROLAC TROMETHAMINE 30 MG/ML
60 INJECTION, SOLUTION INTRAMUSCULAR; INTRAVENOUS
Status: DISCONTINUED | OUTPATIENT
Start: 2019-01-16 | End: 2019-01-16

## 2019-01-16 RX ADMIN — KETOROLAC TROMETHAMINE 10 MG: 10 TABLET, FILM COATED ORAL at 11:59

## 2019-01-16 NOTE — LETTER
NOTIFICATION RETURN TO WORK / SCHOOL 
 
1/16/2019 1:03 PM 
 
Ms. Edi Hu [address] [place] [id number] [address] [address] To Whom It May Concern: 
 
Edi Hu is currently under the care of [facility]. She will return to work/school on: 1/17/2019 If there are questions or concerns please have the patient contact our office. Sincerely, [address], DO
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
## 2019-09-19 ENCOUNTER — OFFICE VISIT (OUTPATIENT)
Dept: FAMILY MEDICINE CLINIC | Age: 34
End: 2019-09-19

## 2019-09-19 VITALS
WEIGHT: 160.4 LBS | OXYGEN SATURATION: 98 % | DIASTOLIC BLOOD PRESSURE: 68 MMHG | RESPIRATION RATE: 20 BRPM | BODY MASS INDEX: 27.39 KG/M2 | HEART RATE: 96 BPM | HEIGHT: 64 IN | SYSTOLIC BLOOD PRESSURE: 105 MMHG | TEMPERATURE: 98.3 F

## 2019-09-19 DIAGNOSIS — Z33.1 PREGNANT STATE, INCIDENTAL: ICD-10-CM

## 2019-09-19 DIAGNOSIS — J01.10 ACUTE NON-RECURRENT FRONTAL SINUSITIS: Primary | ICD-10-CM

## 2019-09-19 RX ORDER — AMOXICILLIN 875 MG/1
875 TABLET, FILM COATED ORAL 2 TIMES DAILY
Qty: 20 TAB | Refills: 0 | Status: SHIPPED | OUTPATIENT
Start: 2019-09-19 | End: 2019-09-29

## 2019-09-19 NOTE — PROGRESS NOTES
Helene Hollins presents today for   Chief Complaint   Patient presents with    Sinus Infection     stuffy nose, runny nose, facial pain, productive cough and sneezing       Helene Hollins preferred language for health care discussion is english/other. Is someone accompanying this pt? no    Is the patient using any DME equipment during [address]? no    Depression Screening:  3 most recent PHQ Screens 9/19/2019   Little interest or pleasure in doing things Not at all   Feeling down, depressed, irritable, or hopeless Not at all   Total Score PHQ 2 0   Trouble falling or staying asleep, or sleeping too much -   Feeling tired or having little energy -   Poor appetite, weight loss, or overeating -   Feeling bad about yourself - or that you are a failure or have let yourself or your family down -   Trouble concentrating on things such as school, work, reading, or watching TV -   Moving or speaking so slowly that other people could have noticed; or the opposite being so fidgety that others notice -   Thoughts of being better off dead, or hurting yourself in some way -   PHQ 9 Score -   How difficult have these problems made it for you to do your work, take care of your home and get along with others -       Learning Assessment:  Learning Assessment 9/19/2019   PRIMARY LEARNER Patient   HIGHEST LEVEL OF EDUCATION - PRIMARY LEARNER  GRADUATED HIGH SCHOOL OR GED   BARRIERS PRIMARY LEARNER NONE   CO-LEARNER CAREGIVER No   PRIMARY LANGUAGE ENGLISH    NEED No   LEARNER PREFERENCE PRIMARY DEMONSTRATION   ANSWERED BY patient   RELATIONSHIP SELF       Abuse Screening:  Abuse Screening Questionnaire 9/19/2019   Do you ever feel afraid of your partner? N   Are you in a relationship with someone who physically or mentally threatens you? N   Is it safe for you to go home? Y       Generalized Anxiety  No flowsheet data found.       Health Maintenance Due   Topic Date Due    DTaP/Tdap/Td series (1 - Tdap) 02/17/2008    Influenza Age 5 to Adult  08/01/2019   . Health Maintenance reviewed and discussed and ordered per Provider. Coordination of Care:  1. Have you been to the ER, urgent care clinic since your last visit? Hospitalized since your last visit? no    2. Have you seen or consulted any other health care providers outside of the [address] [place] [address] since your last visit? Include any pap smears or colon screening. Yes, OB      Advance Directive:  1. Do you have an advance directive in place? Patient Reply:no    2. If not, would you like material regarding how to put one in place?  Patient Reply: no

## 2019-09-19 NOTE — PROGRESS NOTES
HPI  Pt of Dr Selena Wilde. Presents with cold/sinus symptoms. Started two weeks ago with cough. Now has stuffy nose, runny nose, facial pain, productive cough and sneezing. Symptoms have gotten worse. Has tried Tylenol Cold and Robitussin with little effectiveness. Pt is 34.5 weeks pregnant. Last OB appointment was Tuesday. Says all is well with pregnancy. Feeling regular movement. Goes back in two weeks. Past Medical History  Past Medical History:   Diagnosis Date    Concussion 10/4/2010    HTN (hypertension)     Migraine        Surgical History  No past surgical history on file. Medications  Current Outpatient Medications   Medication Sig Dispense Refill    CITRANATAL 90 DHA, ALGAL OIL, 90 mg iron-1 mg -50 mg-300 mg cmpk TK 1 TABLET PO D  3    amoxicillin (AMOXIL) 875 mg tablet Take 1 Tab by mouth two (2) times a day for 10 days. 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    Social Needs    Financial resource strain: Not on file    Food insecurity:     Worry: Not on file     Inability: Not on file    Transportation needs:     Medical: Not on file     Non-medical: Not on file   Tobacco Use    Smoking status: Never Smoker    Smokeless tobacco: Never Used   Substance and Sexual Activity    Alcohol use: Yes     Frequency: Never     Comment: Occasionally    Drug use: No     Types: Prescription, OTC    Sexual activity: Not on file   Lifestyle    Physical activity:     Days per week: Not on file     Minutes per session: Not on file    Stress: Not on file   Relationships    Social connections:     Talks on phone: Not on file     Gets together: Not on file     Attends Baptism service: Not on file     Active member of club or organization: Not on file     Attends meetings of clubs or organizations: Not on file     Relationship status: Not on file    Intimate partner violence:     Fear of current or ex partner: Not on file     Emotionally abused: Not on file     Physically abused: Not on file     Forced sexual activity: Not on file   Other Topics Concern    Not on file   Social History Narrative    Not on file       Problem List  Patient Active Problem List   Diagnosis Code    Concussion S06. 0X9A    Migraine G43.909    Mild depression (HCC) F32.0    Tachycardia R00.0    Elevated BP without diagnosis of hypertension R03.0    Depression F32.9    Acute non-recurrent frontal sinusitis J01.10    Pregnant state, incidental Z33.1       Review of Systems  Review of Systems   Constitutional: Positive for malaise/fatigue. \"feeling flushed\"     HENT: Positive for congestion and sinus pain. Ears feel clogged. Throat scratchy. Respiratory: Positive for cough and sputum production. Musculoskeletal: Negative. Neurological: Positive for headaches. Vital Signs  Vitals:    09/19/19 1209   BP: 105/68   Pulse: 96   Resp: 20   Temp: 98.3 °F (36.8 °C)   TempSrc: Oral   SpO2: 98%   Weight: 160 lb 6.4 oz (72.8 kg)   Height: 5' 4\" (1.626 m)   PainSc:   0 - No pain   LMP: 01/01/2019       Physical Exam  Physical Exam   Constitutional: She is oriented to person, place, and time. She appears well-developed and well-nourished. Appears unwell   HENT:   Right Ear: Tympanic membrane, external ear and ear canal normal.   Left Ear: Tympanic membrane, external ear and ear canal normal.   Nose: Mucosal edema present. Right sinus exhibits frontal sinus tenderness.  Left sinus exhibits frontal sinus tenderness. Mouth/Throat: Uvula is midline, oropharynx is clear and moist and mucous membranes are normal.   Neck: Normal range of motion. Neck supple. Cardiovascular: Normal rate, regular rhythm and normal heart sounds. Pulmonary/Chest: Breath sounds normal. No respiratory distress. She has no wheezes. Lymphadenopathy:     She has cervical adenopathy. Neurological: She is alert and oriented to person, place, and time. Skin: Skin is warm and dry. Psychiatric: She has a normal mood and affect. Nursing note and vitals reviewed. Diagnostics  Orders Placed This Encounter    CITRANATAL 90 DHA, ALGAL OIL, 90 mg iron-1 mg -50 mg-300 mg cmpk     Sig: TK 1 TABLET PO D     Refill:  3    amoxicillin (AMOXIL) 875 mg tablet     Sig: Take 1 Tab by mouth two (2) times a day for 10 days. Dispense:  20 Tab     Refill:  0       Results  Results for orders placed or performed during the hospital encounter of 07/02/18   ECHO ADULT COMPLETE   Result Value Ref Range    LVIDd 3.94 3.9 - 5.3 cm    LVPWd 0.92 (A) 0.6 - 0.9 cm    LVIDs 2.66 cm    IVSd 0.91 (A) 0.6 - 0.9 cm    LVOT d 1.89 cm    LVOT Peak Velocity 98.23 cm/s    LVOT Peak Gradient 3.9 mmHg    LVOT VTI 17.59 cm    LV E' Lateral Velocity 10.19 cm/s    MV A Fracisco 60.80 cm/s    MV E Fracisco 0.66 cm/s    MV E/A 1.1     RVIDd 2.31 cm    LA Vol 4C 27.96 22 - 52 mL    LA Vol 2C 26.86 22 - 52 mL    LA Area 4C 12.8 cm2    LV Mass .7 67 - 162 g    LV Mass AL Index 74.4 g/m2    E/E' lateral 6.4     Mitral Valve E Wave Deceleration Time 241.9 ms    LA Vol Index 16.28 ml/m2    LA Vol Index 16.95 ml/m2         Assessment and Plan  Diagnoses and all orders for this visit:    1. Acute non-recurrent frontal sinusitis  -     amoxicillin (AMOXIL) 875 mg tablet; Take 1 Tab by mouth two (2) times a day for 10 days. Antibiotics as directed.   Increase fluid intake, monitor for fever, saline nasal spray/neti pot with distilled water, steam, rest.  Encouraged pt to double check with OB for approved OTC cold treatment products. 2. Pregnant state, incidental    Follow up with OB as planned. After care summary printed and reviewed with patient. Plan reviewed with patient. Questions answered. Patient verbalized understanding of plan and is in agreement with plan. Patient to follow up if symptoms worsen/ do not improve. Encouraged the use of my chart.     Faustina Zamora, MELISSAP-C

## 2020-01-03 ENCOUNTER — OFFICE VISIT (OUTPATIENT)
Dept: FAMILY MEDICINE CLINIC | Age: 35
End: 2020-01-03

## 2020-01-03 VITALS
WEIGHT: 142 LBS | DIASTOLIC BLOOD PRESSURE: 62 MMHG | RESPIRATION RATE: 20 BRPM | BODY MASS INDEX: 24.24 KG/M2 | OXYGEN SATURATION: 100 % | HEIGHT: 64 IN | SYSTOLIC BLOOD PRESSURE: 102 MMHG | TEMPERATURE: 98.8 F | HEART RATE: 98 BPM

## 2020-01-03 DIAGNOSIS — R68.89 FLU-LIKE SYMPTOMS: ICD-10-CM

## 2020-01-03 DIAGNOSIS — J06.9 URI, ACUTE: Primary | ICD-10-CM

## 2020-01-03 DIAGNOSIS — R09.81 SINUS CONGESTION: ICD-10-CM

## 2020-01-03 LAB
QUICKVUE INFLUENZA TEST: NEGATIVE
VALID INTERNAL CONTROL?: YES

## 2020-01-03 RX ORDER — NORETHINDRONE 0.35 MG
KIT ORAL
COMMUNITY
Start: 2019-12-06 | End: 2021-08-04

## 2020-01-03 NOTE — PROGRESS NOTES
Malaika Salas presents today for   Chief Complaint   Patient presents with    Cold Symptoms      c/o chest congestion with sinus congestion, yellow sputum, afebrile       Is someone accompanying this pt? No    Is the patient using any DME equipment during OV? No    Depression Screening:  3 most recent PHQ Screens 1/3/2020   Little interest or pleasure in doing things Not at all   Feeling down, depressed, irritable, or hopeless Not at all   Total Score PHQ 2 0   Trouble falling or staying asleep, or sleeping too much -   Feeling tired or having little energy -   Poor appetite, weight loss, or overeating -   Feeling bad about yourself - or that you are a failure or have let yourself or your family down -   Trouble concentrating on things such as school, work, reading, or watching TV -   Moving or speaking so slowly that other people could have noticed; or the opposite being so fidgety that others notice -   Thoughts of being better off dead, or hurting yourself in some way -   PHQ 9 Score -   How difficult have these problems made it for you to do your work, take care of your home and get along with others -       Learning Assessment:  Learning Assessment 9/19/2019   PRIMARY LEARNER Patient   HIGHEST LEVEL OF EDUCATION - PRIMARY LEARNER  GRADUATED HIGH SCHOOL OR GED   BARRIERS PRIMARY LEARNER NONE   CO-LEARNER CAREGIVER No   PRIMARY LANGUAGE ENGLISH    NEED No   LEARNER PREFERENCE PRIMARY DEMONSTRATION   ANSWERED BY patient   RELATIONSHIP SELF       Abuse Screening:  Abuse Screening Questionnaire 1/3/2020   Do you ever feel afraid of your partner? N   Are you in a relationship with someone who physically or mentally threatens you? N   Is it safe for you to go home? Y         Health Maintenance Due   Topic Date Due    Influenza Age 5 to Adult  08/01/2019   . Health Maintenance reviewed and discussed and ordered per Provider. Coordination of Care  1.  Have you been to the ER, urgent care clinic since your last visit? Hospitalized since your last visit? No    2. Have you seen or consulted any other health care providers outside of the [address] [place] [address] since your last visit? Include any pap smears or colon screening. No        Advance Directive:  1. Do you have an advance directive in place? Patient Reply:No    2. If not, would you like material regarding how to put one in place?  Patient Reply: No

## 2020-01-03 NOTE — PROGRESS NOTES
HPI  Pt presents with cold symptoms. Started with sore throat and cough on Sunday. Feels that symptoms have worsened over the week. Now complaining of nasal drainage and congestion, chest congestion, productive cough. Pressure in face. T max 100.4    Baby has been ill as well with cough and low grade fever. Was told he had viral infection. Breast feeding. Past Medical History  Past Medical History:   Diagnosis Date    Concussion 10/4/2010    HTN (hypertension)     Migraine        Surgical History  No past surgical history on file. Medications  Current Outpatient Medications   Medication Sig Dispense Refill    CITRANATAL 90 DHA, ALGAL OIL, 90 mg iron-1 mg -50 mg-300 mg cmpk TK 1 TABLET PO D  3    NORLYDA 0.35 mg tab TK 1 T PO QD         Allergies  No Known Allergies    Family History  Family History   Problem Relation Age of Onset    Other Maternal Grandfather         accidental electrocution    Hypertension Mother     Diabetes Maternal Grandmother        Social History  Social History     Socioeconomic History    Marital status:      Spouse name: Not on file    Number of children: Not on file    Years of education: Not on file    Highest education level: Not on file   Occupational History    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    Social Needs    Financial resource strain: Not on file    Food insecurity:     Worry: Not on file     Inability: Not on file    Transportation needs:     Medical: Not on file     Non-medical: Not on file   Tobacco Use    Smoking status: Never Smoker    Smokeless tobacco: Never Used   Substance and Sexual Activity    Alcohol use: Yes     Frequency: Never     Comment: Occasionally    Drug use: No     Types: Prescription, OTC    Sexual activity: Not on file   Lifestyle    Physical activity:     Days per week: Not on file     Minutes per session: Not on file    Stress: Not on file   Relationships    Social connections:     Talks on phone: Not on file     Gets together: Not on file     Attends Hinduism service: Not on file     Active member of club or organization: Not on file     Attends meetings of clubs or organizations: Not on file     Relationship status: Not on file    Intimate partner violence:     Fear of current or ex partner: Not on file     Emotionally abused: Not on file     Physically abused: Not on file     Forced sexual activity: Not on file   Other Topics Concern    Not on file   Social History Narrative    Not on file       Problem List  Patient Active Problem List   Diagnosis Code    Concussion S06. 0X9A    Migraine G43.909    Mild depression (HCC) F32.0    Tachycardia R00.0    Elevated BP without diagnosis of hypertension R03.0    Depression F32.9    Acute non-recurrent frontal sinusitis J01.10    Pregnant state, incidental Z33.1    URI, acute J06.9    Flu-like symptoms R68.89    Sinus congestion R09.81       Review of Systems  Review of Systems   Constitutional: Positive for chills, diaphoresis and fever. HENT: Positive for nosebleeds and sore throat. Respiratory: Positive for cough and wheezing. Musculoskeletal: Positive for myalgias. Neurological: Positive for headaches. Vital Signs  Vitals:    01/03/20 0902   BP: 102/62   Pulse: 98   Resp: 20   Temp: 98.8 °F (37.1 °C)   TempSrc: Oral   SpO2: 100%   Weight: 142 lb (64.4 kg)   Height: 5' 4\" (1.626 m)   PainSc:   0 - No pain       Physical Exam  Physical Exam  Vitals signs and nursing note reviewed. Constitutional:       Appearance: Normal appearance. Comments: Appears fatigued   HENT:      Right Ear: Ear canal and external ear normal.      Left Ear: Ear canal and external ear normal.      Nose: Nose normal.      Right Sinus: No maxillary sinus tenderness or frontal sinus tenderness. Left Sinus: No maxillary sinus tenderness or frontal sinus tenderness.       Mouth/Throat:      Mouth: Mucous membranes are moist.      Pharynx: Oropharynx is clear. Neck:      Musculoskeletal: Normal range of motion and neck supple. Cardiovascular:      Rate and Rhythm: Normal rate and regular rhythm. Heart sounds: Normal heart sounds. Pulmonary:      Effort: Pulmonary effort is normal. No respiratory distress. Breath sounds: Normal breath sounds. No wheezing. Lymphadenopathy:      Cervical: No cervical adenopathy. Skin:     General: Skin is warm and dry. Neurological:      Mental Status: She is alert and oriented to person, place, and time. Psychiatric:         Mood and Affect: Mood normal.         Behavior: Behavior normal.         Diagnostics  Orders Placed This Encounter    AMB POC RAPID INFLUENZA TEST    NORLYDA 0.35 mg tab     Sig: TK 1 T PO QD       Results  Results for orders placed or performed during the hospital encounter of 07/02/18   ECHO ADULT COMPLETE   Result Value Ref Range    LVIDd 3.94 3.9 - 5.3 cm    LVPWd 0.92 (A) 0.6 - 0.9 cm    LVIDs 2.66 cm    IVSd 0.91 (A) 0.6 - 0.9 cm    LVOT d 1.89 cm    LVOT Peak Velocity 98.23 cm/s    LVOT Peak Gradient 3.9 mmHg    LVOT VTI 17.59 cm    LV E' Lateral Velocity 10.19 cm/s    MV A Fracisco 60.80 cm/s    MV E Fracisco 0.66 cm/s    MV E/A 1.1     RVIDd 2.31 cm    LA Vol 4C 27.96 22 - 52 mL    LA Vol 2C 26.86 22 - 52 mL    LA Area 4C 12.8 cm2    LV Mass .7 67 - 162 g    LV Mass AL Index 74.4 g/m2    E/E' lateral 6.4     Mitral Valve E Wave Deceleration Time 241.9 ms    LA Vol Index 16.28 ml/m2    LA Vol Index 16.95 ml/m2     Assessment and Plan  Diagnoses and all orders for this visit:    1. URI, acute  Discussed with pt that this is likely viral infection. Increase fluid intake, monitor for fever, gargle, steam, saline nasal spray, may continue with Robitussin. Discussed that antihistamines may interfere with breast feeing. Encouraged pt to use caution with OTC medications and breastfeeding    2. Flu-like symptoms  Rapid flu negative  3.  Sinus congestion  -     AMB POC RAPID INFLUENZA TEST        After care summary printed and reviewed with patient. Plan reviewed with patient. Questions answered. Patient verbalized understanding of plan and is in agreement with plan. Patient to follow up if symptoms worsen/ do not improve. Encouraged the use of my chart.     Chet Ac, FNP-C

## 2020-01-04 PROBLEM — R68.89 FLU-LIKE SYMPTOMS: Status: ACTIVE | Noted: 2020-01-04

## 2020-01-04 PROBLEM — J06.9 URI, ACUTE: Status: ACTIVE | Noted: 2020-01-04

## 2020-01-04 PROBLEM — R09.81 SINUS CONGESTION: Status: ACTIVE | Noted: 2020-01-04

## 2020-01-06 NOTE — TELEPHONE ENCOUNTER
Pt needs a refill on her birth control. She is out of medication. She has a follow-up appointment with Dr. Tyler Michaud on 5/17/17.
Spoke with patient and she was scheduled for a sooner appt with MAGDIEL Augustine to have med's changed. The patient's pap smear is still scheduled for 5-17-17 with DR. Penn.
06-Jan-2020 07:59

## 2021-08-11 PROBLEM — M48.00 SPINAL STENOSIS: Status: ACTIVE | Noted: 2021-08-11

## 2022-03-19 PROBLEM — R00.0 TACHYCARDIA: Status: ACTIVE | Noted: 2018-06-04

## 2022-03-19 PROBLEM — J06.9 URI, ACUTE: Status: ACTIVE | Noted: 2020-01-04

## 2022-03-19 PROBLEM — R09.81 SINUS CONGESTION: Status: ACTIVE | Noted: 2020-01-04

## 2022-03-19 PROBLEM — R03.0 ELEVATED BP WITHOUT DIAGNOSIS OF HYPERTENSION: Status: ACTIVE | Noted: 2018-06-04

## 2022-03-19 PROBLEM — J01.10 ACUTE NON-RECURRENT FRONTAL SINUSITIS: Status: ACTIVE | Noted: 2019-09-19

## 2022-03-19 PROBLEM — Z33.1 PREGNANT STATE, INCIDENTAL: Status: ACTIVE | Noted: 2019-09-19

## 2022-03-19 PROBLEM — F32.A DEPRESSION: Status: ACTIVE | Noted: 2018-06-04

## 2022-03-19 PROBLEM — R68.89 FLU-LIKE SYMPTOMS: Status: ACTIVE | Noted: 2020-01-04

## 2022-03-20 PROBLEM — F32.A MILD DEPRESSION: Status: ACTIVE | Noted: 2018-05-31

## 2022-03-20 PROBLEM — M48.00 SPINAL STENOSIS: Status: ACTIVE | Noted: 2021-08-11

## 2023-04-24 ASSESSMENT — ENCOUNTER SYMPTOMS: RESPIRATORY NEGATIVE: 1

## 2023-04-25 ENCOUNTER — OFFICE VISIT (OUTPATIENT)
Facility: CLINIC | Age: 38
End: 2023-04-25
Payer: COMMERCIAL

## 2023-04-25 VITALS
WEIGHT: 153 LBS | HEART RATE: 87 BPM | BODY MASS INDEX: 26.12 KG/M2 | TEMPERATURE: 98.3 F | SYSTOLIC BLOOD PRESSURE: 119 MMHG | OXYGEN SATURATION: 98 % | HEIGHT: 64 IN | DIASTOLIC BLOOD PRESSURE: 88 MMHG

## 2023-04-25 DIAGNOSIS — R03.0 ELEVATED BLOOD PRESSURE READING WITHOUT DIAGNOSIS OF HYPERTENSION: Primary | ICD-10-CM

## 2023-04-25 PROCEDURE — 99202 OFFICE O/P NEW SF 15 MIN: CPT | Performed by: FAMILY MEDICINE

## 2023-04-25 RX ORDER — TRAMADOL HYDROCHLORIDE 50 MG/1
50 TABLET ORAL 2 TIMES DAILY PRN
COMMUNITY
Start: 2023-04-11

## 2023-04-25 RX ORDER — CYCLOBENZAPRINE HCL 10 MG
10 TABLET ORAL 2 TIMES DAILY
COMMUNITY
Start: 2023-03-11

## 2023-04-25 RX ORDER — GABAPENTIN 300 MG/1
300 CAPSULE ORAL 2 TIMES DAILY
COMMUNITY
Start: 2023-03-06

## 2023-04-25 ASSESSMENT — PATIENT HEALTH QUESTIONNAIRE - PHQ9
SUM OF ALL RESPONSES TO PHQ QUESTIONS 1-9: 5
SUM OF ALL RESPONSES TO PHQ QUESTIONS 1-9: 5
8. MOVING OR SPEAKING SO SLOWLY THAT OTHER PEOPLE COULD HAVE NOTICED. OR THE OPPOSITE, BEING SO FIGETY OR RESTLESS THAT YOU HAVE BEEN MOVING AROUND A LOT MORE THAN USUAL: 0
4. FEELING TIRED OR HAVING LITTLE ENERGY: 3
SUM OF ALL RESPONSES TO PHQ QUESTIONS 1-9: 5
1. LITTLE INTEREST OR PLEASURE IN DOING THINGS: 1
SUM OF ALL RESPONSES TO PHQ QUESTIONS 1-9: 5
9. THOUGHTS THAT YOU WOULD BE BETTER OFF DEAD, OR OF HURTING YOURSELF: 0
SUM OF ALL RESPONSES TO PHQ9 QUESTIONS 1 & 2: 1
6. FEELING BAD ABOUT YOURSELF - OR THAT YOU ARE A FAILURE OR HAVE LET YOURSELF OR YOUR FAMILY DOWN: 0
2. FEELING DOWN, DEPRESSED OR HOPELESS: 0
10. IF YOU CHECKED OFF ANY PROBLEMS, HOW DIFFICULT HAVE THESE PROBLEMS MADE IT FOR YOU TO DO YOUR WORK, TAKE CARE OF THINGS AT HOME, OR GET ALONG WITH OTHER PEOPLE: 1
3. TROUBLE FALLING OR STAYING ASLEEP: 0
5. POOR APPETITE OR OVEREATING: 0
7. TROUBLE CONCENTRATING ON THINGS, SUCH AS READING THE NEWSPAPER OR WATCHING TELEVISION: 1

## 2023-04-25 NOTE — PROGRESS NOTES
ASSESSMENT/PLAN:  1. Elevated blood pressure reading without diagnosis of hypertension  -     [facility] Blood Pressure Flowsheet  Plan for home blood pressure monitoring. At this time there is no indication for medication. Patient has had recent labs with hematology. She would have to those lab reports are reviewed. We will order follow-up labs if indicated. Return in about 2 weeks (around 5/9/2023) for Review home blood pressure readings and labs from specialty office. SUBJECTIVE/OBJECTIVE:    Chief Complaint   Patient presents with    Hypertension         HPI    Mukesh Teran is a 41 y.o. female [facility] of [facility]. Pt sees Dr Jc Norwood (hematology) for iron def'cy anemia. At that appt, her bp was noted to be elevated. Pt was referred to hematology by her gyn due to anemia caused by hemorrhagia. Pt had been treated with iron pills by her gyn but had constipation and inadequate improvement of her H/H. She was referred for consideration of iron transfusion. However, by that appt her numbers had improved. Review of Systems   Constitutional: Negative. HENT: Negative. Respiratory: Negative. Cardiovascular: Negative. All other systems reviewed and are negative. Physical Exam  Vitals and nursing note reviewed. Constitutional:       General: She is not in acute distress. Appearance: Normal appearance. HENT:      Head: Normocephalic and atraumatic. Right Ear: External ear normal.      Left Ear: External ear normal.      Nose: Nose normal.      Mouth/Throat:      Mouth: Mucous membranes are moist.   Eyes:      Extraocular Movements: Extraocular movements intact. Conjunctiva/sclera: Conjunctivae normal.   Cardiovascular:      Rate and Rhythm: Normal rate and regular rhythm. Heart sounds: No murmur heard. No friction rub. No gallop.    Pulmonary:      Effort: Pulmonary effort is normal.      Breath sounds: Normal breath

## 2023-04-25 NOTE — PROGRESS NOTES
Jovana Maciel presents today for   Chief Complaint   Patient presents with    Hypertension       Is someone accompanying this pt? no    Is the patient using any DME equipment during OV? no    Depression Screening:  PHQ-9 Questionaire 4/25/2023   Little interest or pleasure in doing things 1   Feeling down, depressed, or hopeless 0   Trouble falling or staying asleep, or sleeping too much 0   Feeling tired or having little energy 3   Poor appetite or overeating 0   Feeling bad about yourself - or that you are a failure or have let yourself or your family down 0   Trouble concentrating on things, such as reading the newspaper or watching television 1   Moving or speaking so slowly that other people could have noticed. Or the opposite - being so fidgety or restless that you have been moving around a lot more than usual 0   Thoughts that you would be better off dead, or of hurting yourself in some way 0   PHQ-9 Total Score 5   If you checked off any problems, how difficult have these problems made it for you to do your work, take care of things at home, or get along with other people? 1        ADA 7-Anxiety   No flowsheet data found. Learning Assessment:  No question data found. Fall Risk  No flowsheet data found. Travel Screening:    Travel Screening     No screening recorded since 04/24/23 0000       Travel History   Travel since 03/25/23    No documented travel since 03/25/23          Health Maintenance reviewed and discussed and ordered per Provider. Social Determinants of Health     Tobacco Use: Low Risk     Smoking Tobacco Use: Never    Smokeless Tobacco Use: Never    Passive Exposure: Not on file   Alcohol Use: Not on file   Financial Resource Strain: Not on file   Food Insecurity: Not on file   Transportation Needs: Not on file   Physical Activity: Not on file   Stress: Not on file   Social Connections: Not on file   Intimate Partner Violence: Not on file   Depression:  At risk    PHQ-2 Score: 5
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
## 2023-06-14 DIAGNOSIS — Z82.49 FAMILY HISTORY OF CARDIOVASCULAR DISEASE: ICD-10-CM

## 2023-06-14 DIAGNOSIS — R53.83 OTHER FATIGUE: ICD-10-CM

## 2023-06-18 PROBLEM — Z33.1 PREGNANT STATE, INCIDENTAL: Status: RESOLVED | Noted: 2019-09-19 | Resolved: 2023-06-18

## 2023-06-18 PROBLEM — R68.89 FLU-LIKE SYMPTOMS: Status: RESOLVED | Noted: 2020-01-04 | Resolved: 2023-06-18

## 2023-06-18 PROBLEM — J01.10 ACUTE NON-RECURRENT FRONTAL SINUSITIS: Status: RESOLVED | Noted: 2019-09-19 | Resolved: 2023-06-18

## 2023-06-18 PROBLEM — R09.81 SINUS CONGESTION: Status: RESOLVED | Noted: 2020-01-04 | Resolved: 2023-06-18

## 2023-06-18 PROBLEM — J06.9 URI, ACUTE: Status: RESOLVED | Noted: 2020-01-04 | Resolved: 2023-06-18

## 2023-07-05 ENCOUNTER — HOSPITAL ENCOUNTER (OUTPATIENT)
Facility: HOSPITAL | Age: 38
Discharge: HOME | End: 2023-07-08

## 2023-07-05 LAB — LABCORP SPECIMEN COLLECTION: NORMAL

## 2023-07-06 LAB
25(OH)D3+25(OH)D2 SERPL-MCNC: 63.8 NG/ML (ref 30–100)
CHOLEST SERPL-MCNC: 149 MG/DL (ref 100–199)
HDLC SERPL-MCNC: 67 MG/DL
LDLC SERPL CALC-MCNC: 67 MG/DL (ref 0–99)
SPECIMEN STATUS REPORT: NORMAL
TRIGL SERPL-MCNC: 77 MG/DL (ref 0–149)
VLDLC SERPL CALC-MCNC: 15 MG/DL (ref 5–40)

## 2023-12-28 NOTE — PROGRESS NOTES
Riana Singh (:  1985) is a 41 y.o. female,Established patient, here for evaluation of the following chief complaint(s):  Ear Fullness ( Patient complains of ear fullness with some pain in the morning for 2 weeks . Patient states that she has  tried allergy medications and ear drying drops . Pt states she feels like her ear feels hollow . )         ASSESSMENT/PLAN:  1. Non-recurrent acute serous otitis media of left ear  Assessment & Plan:    Etiology unclear. Ok to try sudafed if home bp wnl. Otherwise, menthol rub on chest and menthol cough drops prn as decongestant. Reassess at f/u.    2. Elevated BP without diagnosis of hypertension  Assessment & Plan:    Log given. Recommend pt montitor home bp readings. Assess at f/u.    3. Tachycardia  Assessment & Plan:    Monitor at home. Recommend using smart watch to facilitate monitoring. Return in about 4 weeks (around 2024) for elevated bp and pulse. Subjective   SUBJECTIVE/OBJECTIVE:  HPI  Pt presents for eval of L ear fullness that has been present for 2 wks. It feels like there is fluid in the ear. She has taken allergy meds to try to make it go away but this was not very helpful. She does not recall any recent illnesses prior to onset of sx. Review of Systems   HENT:  Negative for ear pain. Altered hearing  Ear fullness          Objective   Physical Exam  Vitals and nursing note reviewed. Constitutional:       General: She is not in acute distress. Appearance: Normal appearance. HENT:      Head: Normocephalic and atraumatic. Right Ear: Hearing, tympanic membrane, ear canal and external ear normal.      Left Ear: Hearing, ear canal and external ear normal. A middle ear effusion is present. Tympanic membrane is erythematous. Nose: Nose normal.      Right Turbinates: Not enlarged, swollen or pale. Left Turbinates: Not enlarged, swollen or pale.       Mouth/Throat:      Mouth: Mucous membranes

## 2023-12-29 ENCOUNTER — OFFICE VISIT (OUTPATIENT)
Facility: CLINIC | Age: 38
End: 2023-12-29
Payer: COMMERCIAL

## 2023-12-29 VITALS
HEART RATE: 114 BPM | SYSTOLIC BLOOD PRESSURE: 135 MMHG | BODY MASS INDEX: 25.75 KG/M2 | RESPIRATION RATE: 16 BRPM | WEIGHT: 150 LBS | OXYGEN SATURATION: 98 % | DIASTOLIC BLOOD PRESSURE: 102 MMHG

## 2023-12-29 DIAGNOSIS — R00.0 TACHYCARDIA: ICD-10-CM

## 2023-12-29 DIAGNOSIS — R03.0 ELEVATED BP WITHOUT DIAGNOSIS OF HYPERTENSION: ICD-10-CM

## 2023-12-29 DIAGNOSIS — H65.02 NON-RECURRENT ACUTE SEROUS OTITIS MEDIA OF LEFT EAR: Primary | ICD-10-CM

## 2023-12-29 PROCEDURE — 99214 OFFICE O/P EST MOD 30 MIN: CPT | Performed by: FAMILY MEDICINE

## 2023-12-29 RX ORDER — DIAZEPAM 5 MG/1
5 TABLET ORAL PRN
COMMUNITY
Start: 2023-12-21

## 2023-12-29 NOTE — PROGRESS NOTES
Chief Complaint   Patient presents with    Ear Fullness      Patient complains of ear fullness with some pain in the morning for 2 weeks . Patient states that she has  tried allergy medications and ear drying drops . Pt states she feels like her ear feels hollow . There were no vitals filed for this visit. Depression: At risk (4/25/2023)    PHQ-2     PHQ-2 Score: 5             No data to display                     . \"Have you been to the ER, urgent care clinic since your last visit? Hospitalized since your last visit? \" NO     2. \"Have you seen or consulted any other health care providers outside of the [address] [place] [address] since your last visit? \"  [facility]     3. For patients aged 43-73: Has the patient had a colonoscopy / FIT/ Cologuard? N/A     If the patient is female:    4. For patients aged 43-66: Has the patient had a mammogram within the past 2 years? N/A    5. For patients aged 21-65: Has the patient had a pap smear?  Yes

## 2024-01-15 ENCOUNTER — TELEPHONE (OUTPATIENT)
Facility: CLINIC | Age: 39
End: 2024-01-15

## 2024-01-15 DIAGNOSIS — R00.0 TACHYCARDIA: Primary | ICD-10-CM

## 2024-01-15 NOTE — TELEPHONE ENCOUNTER
Pt [facility] the er [facility] [phone] and when she called today they are asking for dr yuen to send over a referral asap because they are booking in February

## 2024-01-25 NOTE — PROGRESS NOTES
Lelia Robles presents today for   Chief Complaint   Patient presents with    Hypertension      Patient was seen   at ed for elevated BP and pulse . Pts pulse running 120 to 140 at times . She was started on metoprolol at ED doing well on this pulse has been 120 or less . Bps have been ranging 120 to 130s over 90s .  Pt does have appt. 24 with Dr Chelsea moscoso cardiology        Is someone accompanying this pt? No     Is the patient using any DME equipment during OV? NO     Depression Screenin/25/2023     1:39 PM   PHQ-9 Questionaire   Little interest or pleasure in doing things 1   Feeling down, depressed, or hopeless 0   Trouble falling or staying asleep, or sleeping too much 0   Feeling tired or having little energy 3   Poor appetite or overeating 0   Feeling bad about yourself - or that you are a failure or have let yourself or your family down 0   Trouble concentrating on things, such as reading the newspaper or watching television 1   Moving or speaking so slowly that other people could have noticed. Or the opposite - being so fidgety or restless that you have been moving around a lot more than usual 0   Thoughts that you would be better off dead, or of hurting yourself in some way 0   PHQ-9 Total Score 5   If you checked off any problems, how difficult have these problems made it for you to do your work, take care of things at home, or get along with other people? 1        ADA 7-Anxiety        No data to display                   Learning Assessment:  Who is the primary learner? Patient    What is the preferred language for health care of the primary learner? ENGLISH    How does the primary learner prefer to learn new concepts? DEMONSTRATION    Answered By patient    Relationship to Learner SELF           Health Maintenance reviewed and discussed and ordered per Provider.  Transportation Needs: Unknown (2024)    PRAPARE - Transportation     Lack of Transportation (Medical): Not on file

## 2024-01-26 ENCOUNTER — OFFICE VISIT (OUTPATIENT)
Facility: CLINIC | Age: 39
End: 2024-01-26
Payer: COMMERCIAL

## 2024-01-26 VITALS
WEIGHT: 152.8 LBS | SYSTOLIC BLOOD PRESSURE: 116 MMHG | OXYGEN SATURATION: 98 % | RESPIRATION RATE: 16 BRPM | BODY MASS INDEX: 26.23 KG/M2 | DIASTOLIC BLOOD PRESSURE: 82 MMHG | HEART RATE: 102 BPM

## 2024-01-26 DIAGNOSIS — R00.0 TACHYCARDIA: ICD-10-CM

## 2024-01-26 DIAGNOSIS — I10 ESSENTIAL HYPERTENSION: Primary | ICD-10-CM

## 2024-01-26 PROCEDURE — 99213 OFFICE O/P EST LOW 20 MIN: CPT | Performed by: FAMILY MEDICINE

## 2024-01-26 PROCEDURE — 3074F SYST BP LT 130 MM HG: CPT | Performed by: FAMILY MEDICINE

## 2024-01-26 PROCEDURE — 3079F DIAST BP 80-89 MM HG: CPT | Performed by: FAMILY MEDICINE

## 2024-01-26 RX ORDER — METOPROLOL SUCCINATE 50 MG/1
50 TABLET, EXTENDED RELEASE ORAL DAILY
Qty: 30 TABLET | Refills: 5 | Status: SHIPPED | OUTPATIENT
Start: 2024-01-26

## 2024-01-26 RX ORDER — METOPROLOL SUCCINATE 50 MG/1
50 TABLET, EXTENDED RELEASE ORAL DAILY
Qty: 30 TABLET | Refills: 5 | Status: SHIPPED | OUTPATIENT
Start: 2024-01-26 | End: 2024-01-26 | Stop reason: SDUPTHER

## 2024-01-26 SDOH — ECONOMIC STABILITY: FOOD INSECURITY: WITHIN THE PAST 12 MONTHS, YOU WORRIED THAT YOUR FOOD WOULD RUN OUT BEFORE YOU GOT MONEY TO BUY MORE.: NEVER TRUE

## 2024-01-26 SDOH — ECONOMIC STABILITY: HOUSING INSECURITY
IN THE LAST 12 MONTHS, WAS THERE A TIME WHEN YOU DID NOT HAVE A STEADY PLACE TO SLEEP OR SLEPT IN A SHELTER (INCLUDING NOW)?: NO

## 2024-01-26 SDOH — ECONOMIC STABILITY: FOOD INSECURITY: WITHIN THE PAST 12 MONTHS, THE FOOD YOU BOUGHT JUST DIDN'T LAST AND YOU DIDN'T HAVE MONEY TO GET MORE.: NEVER TRUE

## 2024-01-26 SDOH — ECONOMIC STABILITY: INCOME INSECURITY: HOW HARD IS IT FOR YOU TO PAY FOR THE VERY BASICS LIKE FOOD, HOUSING, MEDICAL CARE, AND HEATING?: NOT HARD AT ALL

## 2024-01-26 ASSESSMENT — PATIENT HEALTH QUESTIONNAIRE - PHQ9
7. TROUBLE CONCENTRATING ON THINGS, SUCH AS READING THE NEWSPAPER OR WATCHING TELEVISION: 3
10. IF YOU CHECKED OFF ANY PROBLEMS, HOW DIFFICULT HAVE THESE PROBLEMS MADE IT FOR YOU TO DO YOUR WORK, TAKE CARE OF THINGS AT HOME, OR GET ALONG WITH OTHER PEOPLE: 1
SUM OF ALL RESPONSES TO PHQ QUESTIONS 1-9: 9
6. FEELING BAD ABOUT YOURSELF - OR THAT YOU ARE A FAILURE OR HAVE LET YOURSELF OR YOUR FAMILY DOWN: 0
9. THOUGHTS THAT YOU WOULD BE BETTER OFF DEAD, OR OF HURTING YOURSELF: 0
5. POOR APPETITE OR OVEREATING: 0
SUM OF ALL RESPONSES TO PHQ9 QUESTIONS 1 & 2: 0
8. MOVING OR SPEAKING SO SLOWLY THAT OTHER PEOPLE COULD HAVE NOTICED. OR THE OPPOSITE, BEING SO FIGETY OR RESTLESS THAT YOU HAVE BEEN MOVING AROUND A LOT MORE THAN USUAL: 0
SUM OF ALL RESPONSES TO PHQ QUESTIONS 1-9: 9
1. LITTLE INTEREST OR PLEASURE IN DOING THINGS: 0
2. FEELING DOWN, DEPRESSED OR HOPELESS: 0
SUM OF ALL RESPONSES TO PHQ QUESTIONS 1-9: 9
SUM OF ALL RESPONSES TO PHQ QUESTIONS 1-9: 9
3. TROUBLE FALLING OR STAYING ASLEEP: 3
4. FEELING TIRED OR HAVING LITTLE ENERGY: 3

## 2024-01-26 NOTE — PROGRESS NOTES
ASSESSMENT/PLAN:  1. Essential hypertension  -     metoprolol succinate (TOPROL XL) 50 MG extended release tablet; Take 1 tablet by mouth daily, Disp-30 tablet, R-5Normal  2. Tachycardia  -     metoprolol succinate (TOPROL XL) 50 MG extended release tablet; Take 1 tablet by mouth daily, Disp-30 tablet, R-5Normal        Return in about 4 weeks (around 2/23/2024) for HTN, medication change(s).      SUBJECTIVE/OBJECTIVE:    Chief Complaint   Patient presents with    Hypertension      Patient was seen  1/13 at ed for elevated BP and pulse . Pts pulse running 120 to 140 at times . She was started on metoprolol at ED doing well on this pulse has been 120 or less . Bps have been ranging 120 to 130s over 90s .  Pt does have appt. 2/21/24 with Dr Chelsea moscoso cardiology          HPI    Lelia Robles is a 38 y.o. female presenting today for  4 weeks  follow up of elevated bp and tachycardia.  Both have remained elevated on home monitoring.  Pt was seen at the ER due to c/o chest pain, tachycardia, elevated bp.  Eval in the ER was wnl other than hypoK (3.3).   she was treated with metoprolol by iv and had improvement of her pulse.  Her K was corrected.  She was d/c'ed to home with metoprolol 25mg bid.   Her pulse has been upper 90s-100.  She is tolerating the med.        Patient does need medication refills today.      New concerns today: none        Review of Systems   Constitutional: Negative.    HENT: Negative.     Respiratory: Negative.     Cardiovascular: Negative.    All other systems reviewed and are negative.      Physical Exam  Vitals and nursing note reviewed.   Constitutional:       General: She is not in acute distress.     Appearance: Normal appearance.   HENT:      Head: Normocephalic and atraumatic.      Right Ear: External ear normal.      Left Ear: External ear normal.      Nose: Nose normal.      Mouth/Throat:      Mouth: Mucous membranes are moist.   Eyes:      Extraocular Movements: Extraocular movements

## 2024-01-30 ASSESSMENT — ENCOUNTER SYMPTOMS: RESPIRATORY NEGATIVE: 1

## 2024-06-27 ASSESSMENT — ENCOUNTER SYMPTOMS: RESPIRATORY NEGATIVE: 1

## 2024-06-27 NOTE — PROGRESS NOTES
Lelia Robles (:  1985) is a 39 y.o. female,Established patient, here for evaluation of the following chief complaint(s):  Other (Vaginal discharge and odor x3 weeks intermittently. Reports sexually active in a monogamous relationship. )      Assessment & Plan   1. BV (bacterial vaginosis)  -     metroNIDAZOLE (METROGEL) 0.75 % vaginal gel; Place 1 Applicatorful vaginally daily for 5 days, Vaginal, DAILY Starting 2024, Until Wed 7/3/2024, For 5 days, Disp-70 g, R-0, Normal  2. Vaginal discharge  -     Smear, Wet [place], Saline ([id number])      Return if symptoms worsen or fail to improve.       Subjective   HPI  Patient presents for evaluation of possible bacterial vaginitis.  She reports that she has had copious vag discharge with an odor for a few weeks.  No concerns about sti.     Review of Systems   Constitutional: Negative.    HENT: Negative.     Respiratory: Negative.     Cardiovascular: Negative.    Genitourinary:  Positive for vaginal discharge.   All other systems reviewed and are negative.         Objective   Physical Exam  Exam conducted with a chaperone present.   Constitutional:       General: She is not in acute distress.     Appearance: Normal appearance.   HENT:      Head: Normocephalic and atraumatic.      Right Ear: External ear normal.      Left Ear: External ear normal.      Nose: Nose normal.   Eyes:      Extraocular Movements: Extraocular movements intact.      Conjunctiva/sclera: Conjunctivae normal.   Pulmonary:      Effort: Pulmonary effort is normal.   Genitourinary:     Exam position: Supine.      Labia:         Right: No rash or lesion.         Left: No rash or lesion.       Vagina: Vaginal discharge present. No tenderness or bleeding.      Cervix: No cervical motion tenderness, friability or erythema.   Musculoskeletal:         General: Normal range of motion.      Cervical back: Normal range of motion.   Skin:     General: Skin is warm and dry.   Neurological:

## 2024-06-28 ENCOUNTER — OFFICE VISIT (OUTPATIENT)
Facility: CLINIC | Age: 39
End: 2024-06-28

## 2024-06-28 VITALS
SYSTOLIC BLOOD PRESSURE: 124 MMHG | OXYGEN SATURATION: 99 % | TEMPERATURE: 98.3 F | WEIGHT: 145.4 LBS | HEART RATE: 105 BPM | BODY MASS INDEX: 24.82 KG/M2 | DIASTOLIC BLOOD PRESSURE: 86 MMHG | HEIGHT: 64 IN

## 2024-06-28 DIAGNOSIS — N89.8 VAGINAL DISCHARGE: ICD-10-CM

## 2024-06-28 DIAGNOSIS — B96.89 BV (BACTERIAL VAGINOSIS): Primary | ICD-10-CM

## 2024-06-28 DIAGNOSIS — N76.0 BV (BACTERIAL VAGINOSIS): Primary | ICD-10-CM

## 2024-06-28 LAB — WET PREP (POC): ABNORMAL

## 2024-06-28 RX ORDER — DILTIAZEM HYDROCHLORIDE 120 MG/1
120 CAPSULE, COATED, EXTENDED RELEASE ORAL DAILY
COMMUNITY
Start: 2024-02-21

## 2024-06-28 RX ORDER — METRONIDAZOLE 7.5 MG/G
1 GEL VAGINAL DAILY
Qty: 70 G | Refills: 0 | Status: SHIPPED | OUTPATIENT
Start: 2024-06-28 | End: 2024-07-03

## 2024-06-28 SDOH — ECONOMIC STABILITY: FOOD INSECURITY: WITHIN THE PAST 12 MONTHS, YOU WORRIED THAT YOUR FOOD WOULD RUN OUT BEFORE YOU GOT MONEY TO BUY MORE.: NEVER TRUE

## 2024-06-28 SDOH — ECONOMIC STABILITY: FOOD INSECURITY: WITHIN THE PAST 12 MONTHS, THE FOOD YOU BOUGHT JUST DIDN'T LAST AND YOU DIDN'T HAVE MONEY TO GET MORE.: NEVER TRUE

## 2024-06-28 SDOH — ECONOMIC STABILITY: INCOME INSECURITY: HOW HARD IS IT FOR YOU TO PAY FOR THE VERY BASICS LIKE FOOD, HOUSING, MEDICAL CARE, AND HEATING?: NOT HARD AT ALL

## 2024-06-28 NOTE — PROGRESS NOTES
Lelia Robles presents today for vaginal discharge and odor   Chief Complaint   Patient presents with    Other     Vaginal discharge and odor x3 weeks intermittently. Reports sexually active in a monogamous relationship.        Is someone accompanying this pt? No    Is the patient using any DME equipment during OV? No    Depression Screenin/26/2024     9:26 AM 2023     1:39 PM   PHQ-9 Questionaire   Little interest or pleasure in doing things 0 1   Feeling down, depressed, or hopeless 0 0   Trouble falling or staying asleep, or sleeping too much 3 0   Feeling tired or having little energy 3 3   Poor appetite or overeating 0 0   Feeling bad about yourself - or that you are a failure or have let yourself or your family down 0 0   Trouble concentrating on things, such as reading the newspaper or watching television 3 1   Moving or speaking so slowly that other people could have noticed. Or the opposite - being so fidgety or restless that you have been moving around a lot more than usual 0 0   Thoughts that you would be better off dead, or of hurting yourself in some way 0 0   PHQ-9 Total Score 9 5   If you checked off any problems, how difficult have these problems made it for you to do your work, take care of things at home, or get along with other people? 1 1        ADA 7-Anxiety        No data to display                 Travel Screening:    Travel Screening     No screening recorded since 24 0000       Travel History   Travel since 24    No documented travel since 24          Health Maintenance reviewed and discussed and ordered per Provider.  Transportation Needs: Unknown (2024)    PRAPARE - Transportation     Lack of Transportation (Medical): Not on file     Lack of Transportation (Non-Medical): No      Food Insecurity: No Food Insecurity (2024)    Hunger Vital Sign     Worried About Running Out of Food in the Last Year: Never true     Ran Out of Food in the Last Year: